# Patient Record
Sex: FEMALE | Race: BLACK OR AFRICAN AMERICAN | Employment: OTHER | URBAN - METROPOLITAN AREA
[De-identification: names, ages, dates, MRNs, and addresses within clinical notes are randomized per-mention and may not be internally consistent; named-entity substitution may affect disease eponyms.]

---

## 2017-01-03 DIAGNOSIS — E87.6 HYPOKALEMIA: ICD-10-CM

## 2017-01-03 RX ORDER — POTASSIUM CHLORIDE 750 MG/1
10 TABLET, FILM COATED, EXTENDED RELEASE ORAL 2 TIMES WEEKLY
Qty: 90 TAB | Refills: 3 | Status: SHIPPED | OUTPATIENT
Start: 2017-01-06 | End: 2018-02-06 | Stop reason: SDUPTHER

## 2017-03-13 ENCOUNTER — OFFICE VISIT (OUTPATIENT)
Dept: FAMILY MEDICINE CLINIC | Age: 72
End: 2017-03-13

## 2017-03-13 VITALS
SYSTOLIC BLOOD PRESSURE: 136 MMHG | WEIGHT: 210.4 LBS | HEART RATE: 58 BPM | HEIGHT: 65 IN | OXYGEN SATURATION: 97 % | TEMPERATURE: 96.6 F | RESPIRATION RATE: 16 BRPM | DIASTOLIC BLOOD PRESSURE: 80 MMHG | BODY MASS INDEX: 35.06 KG/M2

## 2017-03-13 DIAGNOSIS — Z12.11 COLON CANCER SCREENING: ICD-10-CM

## 2017-03-13 DIAGNOSIS — R73.02 IGT (IMPAIRED GLUCOSE TOLERANCE): ICD-10-CM

## 2017-03-13 DIAGNOSIS — E78.5 HYPERLIPIDEMIA, UNSPECIFIED HYPERLIPIDEMIA TYPE: ICD-10-CM

## 2017-03-13 DIAGNOSIS — Z51.81 ENCOUNTER FOR MEDICATION MONITORING: ICD-10-CM

## 2017-03-13 DIAGNOSIS — I10 ESSENTIAL HYPERTENSION: Primary | ICD-10-CM

## 2017-03-13 NOTE — PROGRESS NOTES
HISTORY OF PRESENT ILLNESS  Dimitri Stanley is a 66 y.o. female. HPI   Follow up on chronic medical problems. Cardiovascular Review:  The patient has hypertension and hyperlipidemia. Last lipid profile in 11/16 was WNL. Tolerating  Norvasc very well. No chest pain and no SOB. Diet and Lifestyle: generally follows a low fat low cholesterol diet, generally follows a low sodium diet, exercises regularly  Home BP Monitoring: Ranging 120s/80s. Pertinent ROS: taking medications as instructed, no TIA's, no chest pain on exertion, no dyspnea on exertion, no swelling of ankles. Has had some irregular heart beat over the last several weeks. Comes and goes. Glucose intolerance reveiw:  She has IGT. Last a1c level was 5.9% in 12/16. Diabetic ROS - further diabetic ROS: no polyuria or polydipsia, no chest pain, dyspnea or TIA's, no numbness, tingling or pain in extremities, no unusual visual symptoms. Lab review: orders written for new lab studies as appropriate; see orders. Patient Active Problem List   Diagnosis Code    Hypovitaminosis D E55.9    Hyperlipidemia E78.5    Asymptomatic PVCs/APC's I49.3    IGT (impaired glucose tolerance) R73.02    Essential hypertension I10    Encounter for medication monitoring Z51.81       Current Outpatient Prescriptions   Medication Sig Dispense Refill    potassium chloride SR (KLOR-CON 10) 10 mEq tablet Take 1 Tab by mouth two (2) times a week. 90 Tab 3    triamterene-hydroCHLOROthiazide (DYAZIDE) 37.5-25 mg per capsule Take 1 Cap by mouth daily. 90 Cap 3    amLODIPine (NORVASC) 5 mg tablet TAKE 1 TABLET BY MOUTH DAILY 90 Tab 3    promethazine-dextromethorphan (PROMETHAZINE-DM) 6.25-15 mg/5 mL syrup Take 5 mL by mouth every six (6) hours as needed for Cough. 180 mL 1    Calcium-Cholecalciferol, D3, 600 mg(1,500mg) -400 unit chew Take 1 Tab by mouth daily.  aspirin 81 mg tablet Take 1 Tab by mouth daily.          Allergies   Allergen Reactions    Triamterene Swelling and Other (comments)     Diazide is ok. ...just it's generic.     Prinzide [Lisinopril-Hydrochlorothiazide] Shortness of Breath     fatigue       Past Medical History:   Diagnosis Date    Hypertension        Past Surgical History:   Procedure Laterality Date    HX DILATION AND CURETTAGE      [place] COLONOSCOPY FLX DX W/COLLJ SPEC WHEN PFRMD      \"long time ago\"       Family History   Problem Relation Age of Onset    Anemia Mother     Heart Failure Mother     Heart Failure Father     Dementia Father      alzheimers    No Known Problems Sister     No Known Problems Brother     No Known Problems Brother     No Known Problems Brother     Heart Attack Sister     No Known Problems Brother     No Known Problems Sister        Social History   Substance Use Topics    Smoking status: Never Smoker    Smokeless tobacco: Never Used    Alcohol use 0.0 oz/week     0 Standard drinks or equivalent per week      Comment: rarely        Lab Results  Component Value Date/Time   WBC 6.3 12/07/2016 09:54 AM   Hemoglobin (POC) 15.3 12/26/2014 05:11 PM   HGB 13.1 12/07/2016 09:54 AM   Hematocrit (POC) 45 12/26/2014 05:11 PM   HCT 41.7 12/07/2016 09:54 AM   PLATELET 345 74/58/2393 09:54 AM   MCV 74 12/07/2016 09:54 AM       Lab Results  Component Value Date/Time   Cholesterol, total 202 12/07/2016 09:54 AM   HDL Cholesterol 53 12/07/2016 09:54 AM   LDL, calculated 123 12/07/2016 09:54 AM   Triglyceride 130 12/07/2016 09:54 AM       Lab Results  Component Value Date/Time   TSH 0.876 10/15/2014 09:30 AM   T4, Free 1.14 10/15/2014 09:30 AM      Lab Results   Component Value Date/Time    Sodium 141 12/07/2016 09:54 AM    Potassium 3.5 12/07/2016 09:54 AM    Chloride 93 12/07/2016 09:54 AM    CO2 33 12/07/2016 09:54 AM    Anion gap 4 12/26/2014 05:04 PM    Glucose 97 12/07/2016 09:54 AM    BUN 13 12/07/2016 09:54 AM    Creatinine 0.78 12/07/2016 09:54 AM    BUN/Creatinine ratio 17 12/07/2016 09:54 AM    GFR est AA 88 12/07/2016 09:54 AM    GFR est non-AA 76 12/07/2016 09:54 AM    Calcium 9.7 12/07/2016 09:54 AM    Bilirubin, total 0.2 12/07/2016 09:54 AM    ALT (SGPT) 18 12/07/2016 09:54 AM    AST (SGOT) 18 12/07/2016 09:54 AM    Alk. phosphatase 69 12/07/2016 09:54 AM    Protein, total 7.4 12/07/2016 09:54 AM    Albumin 4.4 12/07/2016 09:54 AM    A-G Ratio 1.5 12/07/2016 09:54 AM      Lab Results   Component Value Date/Time    Hemoglobin A1c 6.2 06/26/2013 09:48 AM    Hemoglobin A1c (POC) 5.9 12/07/2016 09:54 AM         Review of Systems   Constitutional: Negative for malaise/fatigue. HENT: Negative for congestion. Eyes: Negative for blurred vision. Respiratory: Negative for cough and shortness of breath. Cardiovascular: Negative for chest pain, palpitations and leg swelling. Gastrointestinal: Negative for abdominal pain, constipation and heartburn. Genitourinary: Negative for dysuria, frequency and urgency. Musculoskeletal: Negative for back pain and joint pain. Neurological: Negative for dizziness, tingling and headaches. Endo/Heme/Allergies: Negative for environmental allergies. Psychiatric/Behavioral: Negative for depression. The patient does not have insomnia. Physical Exam   Constitutional: She appears well-developed and well-nourished. /80  Pulse (!) 58  Temp 96.6 °F (35.9 °C) (Oral)   Resp 16  Ht 5' 5\" (1.651 m)  Wt 210 lb 6.4 oz (95.4 kg)  LMP 08/15/1994  SpO2 97%  BMI 35.01 kg/m2     HENT:   Right Ear: Tympanic membrane and ear canal normal.   Left Ear: Tympanic membrane and ear canal normal.   Nose: No mucosal edema or rhinorrhea. Mouth/Throat: Oropharynx is clear and moist and mucous membranes are normal.   Neck: Normal range of motion. Neck supple. No thyromegaly present. Cardiovascular: Normal rate and regular rhythm. No murmur heard. Pulmonary/Chest: Effort normal and breath sounds normal.   Abdominal: Soft. Bowel sounds are normal. There is no tenderness. Musculoskeletal: Normal range of motion. She exhibits no edema. Lymphadenopathy:     She has no cervical adenopathy. Skin: Skin is warm and dry. Psychiatric: She has a normal mood and affect. Nursing note and vitals reviewed. ASSESSMENT and Ronny Sebas was seen today for hypertension. Diagnoses and all orders for this visit:    Essential hypertension  Discussed sodium restriction, high k rich diet, maintaining ideal body weight and regular exercise program such as daily walking 30 min perday 4-5 times per week, as physiologic means to achieve blood pressure control.  Medication compliance advised. Hyperlipidemia, unspecified hyperlipidemia type  Continue to monitor. Work on diet and exercise. IGT (impaired glucose tolerance)  Continue to monitor. Work on diet and exercise. Encounter for medication monitoring    Colon cancer screening  Declines colonoscopy   -     OCCULT BLOOD, IMMUNOASSAY (FIT)      Follow-up Disposition:  Return in about 4 months (around 7/13/2017) for physical.  reviewed diet, exercise and weight control  cardiovascular risk and specific lipid/LDL goals reviewed  reviewed medications and side effects in detail    I have discussed diagnosis listed in this note with pt and/or family. I have discussed treatment plans and options and the risk/benefit analysis of those options, including safe use of medications and possible medication side effects. Through the use of shared decision making we have agreed to the above plan. The patient has received an after-visit summary and questions were answered concerning future plans and follow up. Advise pt of any urgent changes then to proceed to the ER.

## 2017-03-13 NOTE — MR AVS SNAPSHOT
Visit Information Date & Time Provider Department Dept. Phone Encounter #  
 3/13/2017  8:45 AM Fransisca Putnam MD [facility] [phone] [id number] Follow-up Instructions Return in about 4 months (around 7/13/2017) for physical.  
  
Upcoming Health Maintenance Date Due  
 MEDICARE YEARLY EXAM 3/8/2017 GLAUCOMA SCREENING Q2Y 5/16/2018 BREAST CANCER SCRN MAMMOGRAM 8/3/2018 DTaP/Tdap/Td series (2 - Td) 8/25/2025 Allergies as of 3/13/2017  Review Complete On: 3/13/2017 By: Fransisca Putnam MD  
  
 Severity Noted Reaction Type Reactions Triamterene Medium 05/17/2011    Swelling, Other (comments) Diazide is ok. ...just it's generic. Prinzide [Lisinopril-hydrochlorothiazide]  09/18/2013    Shortness of Breath  
 fatigue Current Immunizations  Reviewed on 3/13/2017 Name Date Influenza High Dose Vaccine PF 11/25/2015, 10/15/2014 Influenza Vaccine 11/19/2013 Influenza Vaccine (Quad) PF 12/27/2016 Influenza Vaccine Split 12/10/2012, 9/20/2011, 11/5/2010 Pneumococcal Conjugate (PCV-13) 8/25/2015 Pneumococcal Polysaccharide (PPSV-23) 12/10/2012 Tdap 8/25/2015 Reviewed by Fransisca Putnam MD on 3/13/2017 at  9:40 AM  
You Were Diagnosed With   
  
 Codes Comments Essential hypertension    -  Primary ICD-10-CM: I10 
ICD-9-CM: 401.9 Hyperlipidemia, unspecified hyperlipidemia type     ICD-10-CM: E78.5 ICD-9-CM: 272.4 IGT (impaired glucose tolerance)     ICD-10-CM: R73.02 
ICD-9-CM: 790.22 Encounter for medication monitoring     ICD-10-CM: Z51.81 
ICD-9-CM: V58.83 Colon cancer screening     ICD-10-CM: Z12.11 ICD-9-CM: V76.51 Vitals BP Pulse Temp Resp Height(growth percentile) Weight(growth percentile) 136/80 (!) 58 96.6 °F (35.9 °C) (Oral) 16 5' 5\" (1.651 m) 210 lb 6.4 oz (95.4 kg) LMP SpO2 BMI OB Status Smoking Status 08/15/1994 97% 35.01 kg/m2 Postmenopausal Never Smoker Vitals History BMI and BSA Data Body Mass Index Body Surface Area 35.01 kg/m 2 2.09 m 2 Preferred Pharmacy Pharmacy Name Phone [facility] [address] [place] [phone] [facility] [address] [phone] Your Updated Medication List  
  
   
This list is accurate as of: 3/13/17  9:45 AM.  Always use your most recent med list. amLODIPine 5 mg tablet Commonly known as:  Marita Darting TAKE 1 TABLET BY MOUTH DAILY  
  
 aspirin 81 mg tablet Take 1 Tab by mouth daily. Calcium-Cholecalciferol (D3) 600 mg(1,500mg) -400 unit Chew Take 1 Tab by mouth daily. potassium chloride SR 10 mEq tablet Commonly known as:  KLOR-CON 10 Take 1 Tab by mouth two (2) times a week. promethazine-dextromethorphan 6.25-15 mg/5 mL syrup Commonly known as:  PROMETHAZINE-DM Take 5 mL by mouth every six (6) hours as needed for Cough. triamterene-hydroCHLOROthiazide 37.5-25 mg per capsule Commonly known as:  Marcina Majors Take 1 Cap by mouth daily. We Performed the Following OCCULT BLOOD, IMMUNOASSAY (FIT) [id number] CPT(R)] Follow-up Instructions Return in about 4 months (around 7/13/2017) for physical.  
  
  
Introducing [facility]! [facility] introduces [employer] patient portal. Now you can access parts of your medical record, email your doctor's office, and request medication refills online. 1. In your internet browser, go to [email or web address] to Have [email or web address] to Have You 2. Click on the First Time User? Click Here link in the Sign In box. You will see the New Member Sign Up page. 3. Enter your [employer] Access Code exactly as it appears below. You will not need to use this code after youve completed the sign-up process. If you do not sign up before the expiration date, you must request a new code. · [employer] Access Code: [email or web address] Expires: 6/11/2017  9:44 AM 
 
 4. Enter the last four digits of your Social Security Number (xxxx) and Date of Birth (mm/dd/yyyy) as indicated and click Submit. You will be taken to the next sign-up page. 5. Create a [employer] ID. This will be your [employer] login ID and cannot be changed, so think of one that is secure and easy to remember. 6. Create a [employer] password. You can change your password at any time. 7. Enter your Password Reset Question and Answer. This can be used at a later time if you forget your password. 8. Enter your e-mail address. You will receive e-mail notification when new information is available in [address] 9. Click Sign Up. You can now view and download portions of your medical record. 10. Click the Download Summary menu link to download a portable copy of your medical information. If you have questions, please visit the Frequently Asked Questions section of the [employer] website. Remember, [employer] is NOT to be used for urgent needs. For medical emergencies, dial 911. Now available from your iPhone and Android! Please provide this summary of care documentation to your next provider. Your primary care clinician is listed as Elia Boas. If you have any questions after today's visit, please call [phone].

## 2017-03-13 NOTE — PROGRESS NOTES
Chief Complaint   Patient presents with    Hypertension     follow up       CMP 12/7/2016    A1C 12/7/2016    Lipid 12/7/2016    Mammogram 8/3/2016     Pt states she has not had a colonoscopy.

## 2017-08-04 ENCOUNTER — OFFICE VISIT (OUTPATIENT)
Dept: FAMILY MEDICINE CLINIC | Age: 72
End: 2017-08-04

## 2017-08-04 VITALS
BODY MASS INDEX: 35.51 KG/M2 | OXYGEN SATURATION: 97 % | HEART RATE: 75 BPM | HEIGHT: 64 IN | WEIGHT: 208 LBS | SYSTOLIC BLOOD PRESSURE: 109 MMHG | DIASTOLIC BLOOD PRESSURE: 70 MMHG | TEMPERATURE: 98.2 F | RESPIRATION RATE: 16 BRPM

## 2017-08-04 DIAGNOSIS — E78.5 HYPERLIPIDEMIA, UNSPECIFIED HYPERLIPIDEMIA TYPE: ICD-10-CM

## 2017-08-04 DIAGNOSIS — Z00.00 ROUTINE GENERAL MEDICAL EXAMINATION AT A HEALTH CARE FACILITY: Primary | ICD-10-CM

## 2017-08-04 DIAGNOSIS — I10 ESSENTIAL HYPERTENSION: ICD-10-CM

## 2017-08-04 DIAGNOSIS — Z91.09 ENVIRONMENTAL ALLERGIES: ICD-10-CM

## 2017-08-04 DIAGNOSIS — R73.02 IGT (IMPAIRED GLUCOSE TOLERANCE): ICD-10-CM

## 2017-08-04 DIAGNOSIS — Z12.11 COLON CANCER SCREENING: ICD-10-CM

## 2017-08-04 DIAGNOSIS — Z51.81 ENCOUNTER FOR MEDICATION MONITORING: ICD-10-CM

## 2017-08-04 LAB
BILIRUB UR QL STRIP: NEGATIVE
GLUCOSE UR-MCNC: NEGATIVE MG/DL
HBA1C MFR BLD HPLC: 5.8 %
KETONES P FAST UR STRIP-MCNC: NEGATIVE MG/DL
PH UR STRIP: 7 [PH] (ref 4.6–8)
PROT UR QL STRIP: NEGATIVE MG/DL
SP GR UR STRIP: 1.01 (ref 1–1.03)
UA UROBILINOGEN AMB POC: NORMAL (ref 0.2–1)
URINALYSIS CLARITY POC: NORMAL
URINALYSIS COLOR POC: YELLOW
URINE BLOOD POC: NORMAL
URINE LEUKOCYTES POC: NEGATIVE
URINE NITRITES POC: NEGATIVE

## 2017-08-04 RX ORDER — CHOLECALCIFEROL (VITAMIN D3) 125 MCG
2000 CAPSULE ORAL DAILY
COMMUNITY

## 2017-08-04 RX ORDER — FLUTICASONE PROPIONATE 50 MCG
2 SPRAY, SUSPENSION (ML) NASAL AS NEEDED
COMMUNITY
End: 2019-07-03

## 2017-08-04 RX ORDER — LANOLIN ALCOHOL/MO/W.PET/CERES
325 CREAM (GRAM) TOPICAL 2 TIMES WEEKLY
COMMUNITY

## 2017-08-04 NOTE — PROGRESS NOTES
This is a Subsequent Medicare Annual Wellness Visit providing Personalized Prevention Plan Services (PPPS) (Performed 12 months after initial AWV and PPPS )    I have reviewed the patient's medical history in detail and updated the computerized patient record. Cardiovascular Review:  The patient has hypertension and hyperlipidemia. Last lipid profile in 11/16 was WNL. Tolerating  Norvasc very well. No chest pain and no SOB. Diet and Lifestyle: generally follows a low fat low cholesterol diet, generally follows a low sodium diet, exercises regularly  Home BP Monitoring: Ranging 120s/80s. Pertinent ROS: taking medications as instructed, no TIA's, no chest pain on exertion, no dyspnea on exertion, no swelling of ankles. Has had some irregular heart beat over the last several weeks. Comes and goes. Glucose intolerance reveiw:  She has IGT. Last a1c level was 5.9% in 12/16. Diabetic ROS - further diabetic ROS: no polyuria or polydipsia, no chest pain, dyspnea or TIA's, no numbness, tingling or pain in extremities, no unusual visual symptoms. Lab review: orders written for new lab studies as appropriate; see orders.      History     Past Medical History:   Diagnosis Date    Arthritis     osteo - right knee    GERD (gastroesophageal reflux disease)     Goiter     Ill-defined condition     borderline diabetes    Obstructive sleep apnea (adult) (pediatric)     Unspecified sleep apnea     CPAP      Past Surgical History:   Procedure Laterality Date    COLONOSCOPY N/A 1/23/2017    COLONOSCOPY performed by Cristi Willams MD at [address] [facility], [facility]  2004    repeat 10 yrs     HX CATARACT REMOVAL  6/12    BILATERAL    HX GYN      C - section x 1    HX HEENT  2006    thyroid goiter removed    HX ORTHOPAEDIC  11/13/2013    right knee replacement    HX OTHER SURGICAL      no colon polyps per pt    ID COLONOSCOPY FLX DX W/COLLJ SPEC WHEN PFRMD  3/14/2007 dr. Paola Castaneda    DE COLONOSCOPY W/BIOPSY SINGLE/MULTIPLE  1/23/2012    dr Paola Castaneda     Current Outpatient Prescriptions   Medication Sig Dispense Refill    fexofenadine-pseudoephedrine (ALLEGRA-D 12 HOUR)  mg Tb12 Take 1 Tab by mouth every twelve (12) hours as needed.  traMADol (ULTRAM) 50 mg tablet TAKE 1 TABLET BY MOUTH EVERY 6 HOURS AS NEEDED FOR PAIN MAX 4/DAY 30 Tab 1    VAGIFEM 10 mcg tab vaginal tablet Insert 10 mcg into vagina two (2) times a week. 12    aspirin delayed-release 81 mg tablet Take 81 mg by mouth daily.  cholecalciferol, vitamin d3, (VITAMIN D) 1,000 unit tablet Take 2,000 Units by mouth daily.  MULTIVITAMIN PO Take 1 Tab by mouth daily. Allergies   Allergen Reactions    Oxycodone Other (comments)     Hallucinations     Family History   Problem Relation Age of Onset    Hypertension Mother     Asthma Mother     Heart Disease Mother     Hypertension Father     Heart Disease Father     Hypertension Sister     Alcohol abuse Brother     Heart Disease Brother     Hypertension Sister     Hypertension Sister     Hypertension Sister     Diabetes Brother     Hypertension Brother     Cancer Brother 71     stomach    Hypertension Brother     Other Brother      took awhile to wake up after anesthesia/pt states he had surgery on a Mon and then [de-identified] and Wed is when he had trouble waking up   [employer] Son      colon.     No Known Problems Sister     Hypertension Brother     Diabetes Maternal Uncle     Diabetes Maternal Grandmother      Social History   Substance Use Topics    Smoking status: Never Smoker    Smokeless tobacco: Never Used    Alcohol use Yes      Comment: occasional     Patient Active Problem List   Diagnosis Code    GERD (gastroesophageal reflux disease) K21.9    Obstructive sleep apnea (adult) (pediatric) G47.33    Thyroid disease E07.9    Family history of colon cancer Z80.0    OA (osteoarthritis) M19.90    Multinodular goiter E04.2    Osteoarthritis of right knee M17.11    Primary osteoarthritis of both knees M17.0    Prediabetes R73.03    Encounter for medication monitoring Z51.81    IGT (impaired glucose tolerance) R73.02     Lab Results  Component Value Date/Time   WBC 6.3 12/07/2016 09:54 AM   HGB 13.1 12/07/2016 09:54 AM   Hemoglobin (POC) 15.3 12/26/2014 05:11 PM   HCT 41.7 12/07/2016 09:54 AM   Hematocrit (POC) 45 12/26/2014 05:11 PM   PLATELET 042 80/80/7219 09:54 AM   MCV 74 12/07/2016 09:54 AM   Lab Results  Component Value Date/Time   Cholesterol, total 202 12/07/2016 09:54 AM   HDL Cholesterol 53 12/07/2016 09:54 AM   LDL, calculated 123 12/07/2016 09:54 AM   Triglyceride 130 12/07/2016 09:54 AM   Lab Results  Component Value Date/Time   TSH 0.876 10/15/2014 09:30 AM   T4, Free 1.14 10/15/2014 09:30 AM      Lab Results   Component Value Date/Time    Sodium 141 12/07/2016 09:54 AM    Potassium 3.5 12/07/2016 09:54 AM    Chloride 93 12/07/2016 09:54 AM    CO2 33 12/07/2016 09:54 AM    Anion gap 4 12/26/2014 05:04 PM    Glucose 97 12/07/2016 09:54 AM    BUN 13 12/07/2016 09:54 AM    Creatinine 0.78 12/07/2016 09:54 AM    BUN/Creatinine ratio 17 12/07/2016 09:54 AM    GFR est AA 88 12/07/2016 09:54 AM    GFR est non-AA 76 12/07/2016 09:54 AM    Calcium 9.7 12/07/2016 09:54 AM    Bilirubin, total 0.2 12/07/2016 09:54 AM    ALT (SGPT) 18 12/07/2016 09:54 AM    AST (SGOT) 18 12/07/2016 09:54 AM    Alk. phosphatase 69 12/07/2016 09:54 AM    Protein, total 7.4 12/07/2016 09:54 AM    Albumin 4.4 12/07/2016 09:54 AM    A-G Ratio 1.5 12/07/2016 09:54 AM      Lab Results   Component Value Date/Time    Hemoglobin A1c 6.2 06/26/2013 09:48 AM    Hemoglobin A1c (POC) 5.8 08/04/2017 11:44 AM        Depression Risk Factor Screening:     PHQ over the last two weeks 6/20/2017   Little interest or pleasure in doing things Not at all   Feeling down, depressed or hopeless Not at all   Total Score PHQ 2 0     Alcohol Risk Factor Screening:    On any occasion during the past 3 months, have you had more than 3 drinks containing alcohol? No    Do you average more than 7 drinks per week? No    Functional Ability and Level of Safety:     Hearing Loss   none    Activities of Daily Living   Self-care. Requires assistance with: no ADLs    Fall Risk     Fall Risk Assessment, last 12 mths 6/20/2017   Able to walk? Yes   Fall in past 12 months? No     Functional Ability:   Does the patient exhibit a steady gait? yes    How long did it take the patient to get up and walk from a sitting position? seconds    Is the patient self reliant? (ie can do own laundry, meals, household chores)  yes   Does the patient handle his/her own medications? yes   Does the patient handle his/her own money? yes   Is the patients home safe (ie good lighting, handrails on stairs and bath, etc.)? yes   Did you notice or did patient express any hearing difficulties? no   Did you notice or did patient express any vision difficulties?  no   Were distance and reading eye charts used? yes     Advance Care Planning:   Patient was offered the opportunity to discuss advance care planning:  yes    Does patient have an Advance Directive:  no   If no, did you provide information on Caring Connections?   yes          Abuse Screen   Patient is not abused    Review of Systems   Constitutional: negative for fatigue and malaise  Eyes: negative for irritation and redness  Ears, nose, mouth, throat, and face: negative for earaches, nasal congestion and hoarseness  Respiratory: negative for cough, sputum or dyspnea on exertion  Cardiovascular: negative for chest pain, chest pressure/discomfort, dyspnea, palpitations, irregular heart beats, fatigue, lower extremity edema  Gastrointestinal: negative for dysphagia, dyspepsia, reflux symptoms, nausea, vomiting, change in bowel habits, melena, constipation and abdominal pain  Genitourinary:negative for frequency, dysuria, nocturia, urinary incontinence and vaginal discharge  Integument/breast: negative for rash and dryness  Hematologic/lymphatic: negative for easy bruising and lymphadenopathy  Musculoskeletal:negative for myalgias, arthralgias, stiff joints, neck pain, back pain and muscle weakness  Neurological: negative for headaches, dizziness, tremor and weakness  Endocrine: negative for diabetic symptoms including polyuria and polydipsia    She has not had a colonoscopy and declined exam.  Will get FIT. Physical Examination     Evaluation of Cognitive Function:  Mood/affect:  neutral  Appearance: age appropriate  Family member/caregiver input: NA    Visit Vitals    /70 (BP 1 Location: Right arm, BP Patient Position: Sitting)    Pulse 75    Temp 98.2 °F (36.8 °C) (Oral)    Resp 16    Ht 5' 4.25\" (1.632 m)    Wt 208 lb (94.3 kg)    LMP 08/15/1994    SpO2 97%    BMI 35.43 kg/m2     General:  Alert, cooperative, no distress, appears stated age. Head:  Normocephalic, without obvious abnormality, atraumatic. Ears:  Normal TMs and external ear canals both ears. Nose: Nares normal. Septum midline. Mucosa normal. No drainage or sinus tenderness. Throat: Lips, mucosa, and tongue normal. Teeth and gums normal.   Neck: Supple, symmetrical, trachea midline, no adenopathy, thyroid: no enlargement/tenderness/nodules, no carotid bruit and no JVD. Back:   Symmetric, no curvature. ROM normal. No CVA tenderness. Lungs:   Clear to auscultation bilaterally. Chest wall:  No tenderness or deformity. Heart:  Regular rate and rhythm, S1, S2 normal, no murmur, click, rub or gallop. Breast Exam:  No tenderness, masses, or nipple abnormality. Abdomen:   Soft, non-tender. Bowel sounds normal. No masses,  No organomegaly. Rectal:  Normal tone,  no masses or tenderness  Guaiac negative stool. Extremities: Extremities normal, atraumatic, no cyanosis or edema. Pulses: 2+ and symmetric all extremities.    Skin: Skin color, texture, turgor normal. No rashes or lesions. Lymph nodes: Cervical, supraclavicular, and axillary nodes normal.   Neurologic: CNII-XII intact. Normal strength, sensation and reflexes throughout. Patient Care Team:  Amado Orozco MD as PCP - Agueda Nava MD as Physician (Sleep Medicine)  Alecia Fitzpatrick MD as Consulting Provider (Endocrinology)    Advice/Referrals/Counseling   Education and counseling provided:  Are appropriate based on today's review and evaluation  End-of-Life planning (with patient's consent)      Assessment/Plan   Ariel Haynes was seen today for vaginal itching and cholesterol problem. Diagnoses and all orders for this visit:    ASSESSMENT and PLAN  Diagnoses and all orders for this visit:    1. Routine general medical examination at a health care facility  -     AMB POC URINALYSIS DIP STICK AUTO W/O MICRO    2. Colon cancer screening  -     OCCULT BLOOD, IMMUNOASSAY (FIT)    3. Essential hypertension  Stable     4. Hyperlipidemia, unspecified hyperlipidemia type  -     LIPID PANEL    5. IGT (impaired glucose tolerance)  -     AMB POC HEMOGLOBIN A1C    6. Encounter for medication monitoring  -     METABOLIC PANEL, COMPREHENSIVE  -     CBC W/O DIFF    7. Environmental allergies  Stable with flonase      Follow-up Disposition:  Return in about 6 months (around 2/4/2018). reviewed diet, exercise and weight control  cardiovascular risk and specific lipid/LDL goals reviewed  reviewed medications and side effects in detail  specific diabetic recommendations: low cholesterol diet, weight control and daily exercise discussed and glycohemoglobin and other lab monitoring discussed      I have discussed diagnosis listed in this note with pt and/or family. I have discussed treatment plans and options and the risk/benefit analysis of those options, including safe use of medications and possible medication side effects. Through the use of shared decision making we have agreed to the above plan. The patient has received an after-visit summary and questions were answered concerning future plans and follow up. Advise pt of any urgent changes then to proceed to the ER.

## 2017-08-04 NOTE — PROGRESS NOTES
Chief Complaint   Patient presents with    Complete Physical     Medicare Wellness    CMP 12/7/2016    A1C 12/7/2016    Lipid 12/7/2016    Vitamin D 12/7/16    Mammogram 8/3/2016     Pt states she has not had a colonoscopy.

## 2017-08-04 NOTE — MR AVS SNAPSHOT
Visit Information Date & Time Provider Department Dept. Phone Encounter #  
 8/4/2017 10:15 AM Morena Del Toro MD [facility] [phone] [id number] Follow-up Instructions Return in about 6 months (around 2/4/2018). Upcoming Health Maintenance Date Due  
 MEDICARE YEARLY EXAM 3/8/2017 INFLUENZA AGE 9 TO ADULT 8/1/2017 GLAUCOMA SCREENING Q2Y 5/16/2018 BREAST CANCER SCRN MAMMOGRAM 8/3/2018 DTaP/Tdap/Td series (2 - Td) 8/25/2025 Allergies as of 8/4/2017  Review Complete On: 8/4/2017 By: Morena Del Toro MD  
  
 Severity Noted Reaction Type Reactions Triamterene Medium 05/17/2011    Swelling, Other (comments) Diazide is ok. ...just it's generic. Prinzide [Lisinopril-hydrochlorothiazide]  09/18/2013    Shortness of Breath  
 fatigue Current Immunizations  Reviewed on 3/13/2017 Name Date Influenza High Dose Vaccine PF 11/25/2015, 10/15/2014 Influenza Vaccine 11/19/2013 Influenza Vaccine (Quad) PF 12/27/2016 Influenza Vaccine Split 12/10/2012, 9/20/2011, 11/5/2010 Pneumococcal Conjugate (PCV-13) 8/25/2015 Pneumococcal Polysaccharide (PPSV-23) 12/10/2012 Tdap 8/25/2015 Not reviewed this visit You Were Diagnosed With   
  
 Codes Comments Routine general medical examination at a health care facility    -  Primary ICD-10-CM: Z00.00 ICD-9-CM: V70.0 Colon cancer screening     ICD-10-CM: Z12.11 ICD-9-CM: V76.51 Essential hypertension     ICD-10-CM: I10 
ICD-9-CM: 401.9 IGT (impaired glucose tolerance)     ICD-10-CM: R73.02 
ICD-9-CM: 790.22 Hyperlipidemia, unspecified hyperlipidemia type     ICD-10-CM: E78.5 ICD-9-CM: 272.4 Encounter for medication monitoring     ICD-10-CM: Z51.81 
ICD-9-CM: V58.83 Vitals BP Pulse Temp Resp Height(growth percentile) Weight(growth percentile)  109/70 (BP 1 Location: Right arm, BP Patient Position: Sitting) 75 98.2 °F (36.8 °C) (Oral) 16 5' 4.25\" (1.632 m) 208 lb (94.3 kg) LMP SpO2 BMI OB Status Smoking Status 08/15/1994 97% 35.43 kg/m2 Postmenopausal Never Smoker Vitals History BMI and BSA Data Body Mass Index Body Surface Area  
 35.43 kg/m 2 2.07 m 2 Preferred Pharmacy Pharmacy Name Phone Megan Collier [address] [place], [place] [address] [address] [phone] Your Updated Medication List  
  
   
This list is accurate as of: 8/4/17 11:49 AM.  Always use your most recent med list. amLODIPine 5 mg tablet Commonly known as:  Skip Newcomer TAKE 1 TABLET BY MOUTH DAILY  
  
 aspirin 81 mg tablet Take 1 Tab by mouth daily. ferrous sulfate 325 mg (65 mg iron) tablet Take 325 mg by mouth as needed. FLONASE 50 mcg/actuation nasal spray Generic drug:  fluticasone 2 Sprays by Both Nostrils route as needed for Rhinitis. potassium chloride SR 10 mEq tablet Commonly known as:  KLOR-CON 10 Take 1 Tab by mouth two (2) times a week. triamterene-hydroCHLOROthiazide 37.5-25 mg per capsule Commonly known as:  Ulises Speak Take 1 Cap by mouth daily. VITAMIN D3 2,000 unit Tab Generic drug:  cholecalciferol (vitamin D3) Take 2,000 Units by mouth daily. We Performed the Following AMB POC HEMOGLOBIN A1C [96430 CPT(R)] AMB POC URINALYSIS DIP STICK AUTO W/O MICRO [20173 CPT(R)] CBC W/O DIFF [71722 CPT(R)] LIPID PANEL [52316 CPT(R)] METABOLIC PANEL, COMPREHENSIVE [66303 CPT(R)] OCCULT BLOOD, IMMUNOASSAY (FIT) [id number] CPT(R)] Follow-up Instructions Return in about 6 months (around 2/4/2018). Introducing [facility] SERVICES! Vito Encarnacion introduces [employer] patient portal. Now you can access parts of your medical record, email your doctor's office, and request medication refills online. 1. In your internet browser, go to [email or web address] 2. Click on the First Time User? Click Here link in the Sign In box. You will see the New Member Sign Up page. 3. Enter your [email or web address] Access Code exactly as it appears below. You will not need to use this code after youve completed the sign-up process. If you do not sign up before the expiration date, you must request a new code. · [email or web address] Access Code: [email or web address] Expires: 11/2/2017 11:49 AM 
 
4. Enter the last four digits of your Social Security Number (xxxx) and Date of Birth (mm/dd/yyyy) as indicated and click Submit. You will be taken to the next sign-up page. 5. Create a [email or web address] ID. This will be your [email or web address] login ID and cannot be changed, so think of one that is secure and easy to remember. 6. Create a [email or web address] password. You can change your password at any time. 7. Enter your Password Reset Question and Answer. This can be used at a later time if you forget your password. 8. Enter your e-mail address. You will receive e-mail notification when new information is available in [address] 9. Click Sign Up. You can now view and download portions of your medical record. 10. Click the Download Summary menu link to download a portable copy of your medical information. If you have questions, please visit the Frequently Asked Questions section of the [email or web address] website. Remember, [email or web address] is NOT to be used for urgent needs. For medical emergencies, dial 911. Now available from your iPhone and Android! Please provide this summary of care documentation to your next provider. Your primary care clinician is listed as Huel Glance. If you have any questions after today's visit, please call [phone].

## 2017-08-05 LAB
ALBUMIN SERPL-MCNC: 4.6 G/DL (ref 3.5–4.8)
ALBUMIN/GLOB SERPL: 1.5 {RATIO} (ref 1.2–2.2)
ALP SERPL-CCNC: 62 IU/L (ref 39–117)
ALT SERPL-CCNC: 15 IU/L (ref 0–32)
AST SERPL-CCNC: 20 IU/L (ref 0–40)
BILIRUB SERPL-MCNC: 0.3 MG/DL (ref 0–1.2)
BUN SERPL-MCNC: 14 MG/DL (ref 8–27)
BUN/CREAT SERPL: 15 (ref 12–28)
CALCIUM SERPL-MCNC: 9.9 MG/DL (ref 8.7–10.3)
CHLORIDE SERPL-SCNC: 94 MMOL/L (ref 96–106)
CHOLEST SERPL-MCNC: 188 MG/DL (ref 100–199)
CO2 SERPL-SCNC: 29 MMOL/L (ref 18–29)
CREAT SERPL-MCNC: 0.95 MG/DL (ref 0.57–1)
ERYTHROCYTE [DISTWIDTH] IN BLOOD BY AUTOMATED COUNT: 16.6 % (ref 12.3–15.4)
GLOBULIN SER CALC-MCNC: 3 G/DL (ref 1.5–4.5)
GLUCOSE SERPL-MCNC: 91 MG/DL (ref 65–99)
HCT VFR BLD AUTO: 41.2 % (ref 34–46.6)
HDLC SERPL-MCNC: 56 MG/DL
HGB BLD-MCNC: 13.3 G/DL (ref 11.1–15.9)
INTERPRETATION, 910389: NORMAL
LDLC SERPL CALC-MCNC: 112 MG/DL (ref 0–99)
MCH RBC QN AUTO: 24.1 PG (ref 26.6–33)
MCHC RBC AUTO-ENTMCNC: 32.3 G/DL (ref 31.5–35.7)
MCV RBC AUTO: 75 FL (ref 79–97)
PLATELET # BLD AUTO: 309 X10E3/UL (ref 150–379)
POTASSIUM SERPL-SCNC: 3.7 MMOL/L (ref 3.5–5.2)
PROT SERPL-MCNC: 7.6 G/DL (ref 6–8.5)
RBC # BLD AUTO: 5.53 X10E6/UL (ref 3.77–5.28)
SODIUM SERPL-SCNC: 141 MMOL/L (ref 134–144)
TRIGL SERPL-MCNC: 101 MG/DL (ref 0–149)
VLDLC SERPL CALC-MCNC: 20 MG/DL (ref 5–40)
WBC # BLD AUTO: 6.2 X10E3/UL (ref 3.4–10.8)

## 2017-10-18 ENCOUNTER — CLINICAL SUPPORT (OUTPATIENT)
Dept: FAMILY MEDICINE CLINIC | Age: 72
End: 2017-10-18

## 2017-10-18 DIAGNOSIS — Z23 ENCOUNTER FOR IMMUNIZATION: Primary | ICD-10-CM

## 2017-10-18 NOTE — MR AVS SNAPSHOT
Visit Information Date & Time Provider Department Dept. Phone Encounter #  
 10/18/2017  7:30 AM Howard SwansonJony [phone] [id number] Your Appointments 2/5/2018  7:45 AM  
ROUTINE CARE with Howard Swanson MD  
[facility]) Appt Note: f/u  
 [address] [facility] [address] [id number] [phone] [address] Upcoming Health Maintenance Date Due INFLUENZA AGE 9 TO ADULT 8/1/2017 GLAUCOMA SCREENING Q2Y 5/16/2018 BREAST CANCER SCRN MAMMOGRAM 8/3/2018 MEDICARE YEARLY EXAM 8/5/2018 DTaP/Tdap/Td series (2 - Td) 8/25/2025 Allergies as of 10/18/2017  Review Complete On: 10/18/2017 By: Juli Glass LPN Severity Noted Reaction Type Reactions Triamterene Medium 05/17/2011    Swelling, Other (comments) Diazide is ok. ...just it's generic. Prinzide [Lisinopril-hydrochlorothiazide]  09/18/2013    Shortness of Breath  
 fatigue Current Immunizations  Reviewed on 8/4/2017 Name Date Influenza High Dose Vaccine PF 10/18/2017, 11/25/2015, 10/15/2014 Influenza Vaccine 11/19/2013 Influenza Vaccine (Quad) PF 12/27/2016 Influenza Vaccine Split 12/10/2012, 9/20/2011, 11/5/2010 Pneumococcal Conjugate (PCV-13) 8/25/2015 Pneumococcal Polysaccharide (PPSV-23) 12/10/2012 Tdap 8/25/2015 Not reviewed this visit You Were Diagnosed With   
  
 Codes Comments Encounter for immunization    -  Primary ICD-10-CM: Q43 ICD-9-CM: V03.89 Vitals LMP OB Status Smoking Status 08/15/1994 Postmenopausal Never Smoker Preferred Pharmacy Pharmacy Name Phone [address] [phone] Your Updated Medication List  
  
   
This list is accurate as of: 10/18/17  7:38 AM.  Always use your most recent med list.  
  
 amLODIPine 5 mg tablet Commonly known as:  Alpesh Jean TAKE 1 TABLET BY MOUTH DAILY  
  
 aspirin 81 mg tablet Take 1 Tab by mouth daily. ferrous sulfate 325 mg (65 mg iron) tablet Take 325 mg by mouth as needed. FLONASE 50 mcg/actuation nasal spray Generic drug:  fluticasone 2 Sprays by Both Nostrils route as needed for Rhinitis. potassium chloride SR 10 mEq tablet Commonly known as:  KLOR-CON 10 Take 1 Tab by mouth two (2) times a week. triamterene-hydroCHLOROthiazide 37.5-25 mg per capsule Commonly known as:  Blanco [place] Take 1 Cap by mouth daily. VITAMIN D3 2,000 unit Tab Generic drug:  cholecalciferol (vitamin D3) Take 2,000 Units by mouth daily. We Performed the Following INFLUENZA VIRUS VACCINE, HIGH DOSE SEASONAL, PRESERVATIVE FREE [21285 CPT(R)] NV IMMUNIZ ADMIN,1 SINGLE/COMB VAC/TOXOID [id number] CPT(R)] Introducing [facility]! Ernestine Beltran introduces [employer] patient portal. Now you can access parts of your medical record, email your doctor's office, and request medication refills online. 1. In your internet browser, go to [email or web address] 2. Click on the First Time User? Click Here link in the Sign In box. You will see the New Member Sign Up page. 3. Enter your [employer] Access Code exactly as it appears below. You will not need to use this code after youve completed the sign-up process. If you do not sign up before the expiration date, you must request a new code. · [employer] Access Code: [email or web address] Expires: 11/2/2017 11:49 AM 
 
4. Enter the last four digits of your Social Security Number (xxxx) and Date of Birth (mm/dd/yyyy) as indicated and click Submit. You will be taken to the next sign-up page. 5. Create a [employer] ID. This will be your [employer] login ID and cannot be changed, so think of one that is secure and easy to remember. 6. Create a SwapDrive password. You can change your password at any time. 7. Enter your Password Reset Question and Answer. This can be used at a later time if you forget your password. 8. Enter your e-mail address. You will receive e-mail notification when new information is available in [address] 9. Click Sign Up. You can now view and download portions of your medical record. 10. Click the Download Summary menu link to download a portable copy of your medical information. If you have questions, please visit the Frequently Asked Questions section of the [employer] website. Remember, [employer] is NOT to be used for urgent needs. For medical emergencies, dial 911. Now available from your iPhone and Android! Please provide this summary of care documentation to your next provider. Your primary care clinician is listed as Meaghan Caraballo. If you have any questions after today's visit, please call [phone].

## 2017-10-18 NOTE — PROGRESS NOTES
Verbal order received by Dr. Rosas Feeling dose flu vaccine IM. Pt received high dose flu vaccine IM in right deltoid without any difficulty.

## 2017-12-12 DIAGNOSIS — I10 ESSENTIAL HYPERTENSION: ICD-10-CM

## 2017-12-12 RX ORDER — AMLODIPINE BESYLATE 5 MG/1
TABLET ORAL
Qty: 90 TAB | Refills: 3 | Status: SHIPPED | OUTPATIENT
Start: 2017-12-12 | End: 2019-03-01 | Stop reason: SDUPTHER

## 2017-12-12 RX ORDER — TRIAMTERENE AND HYDROCHLOROTHIAZIDE 37.5; 25 MG/1; MG/1
1 CAPSULE ORAL DAILY
Qty: 90 CAP | Refills: 3 | Status: SHIPPED | OUTPATIENT
Start: 2017-12-12 | End: 2018-05-23 | Stop reason: SDUPTHER

## 2018-02-06 ENCOUNTER — OFFICE VISIT (OUTPATIENT)
Dept: FAMILY MEDICINE CLINIC | Age: 73
End: 2018-02-06

## 2018-02-06 VITALS
RESPIRATION RATE: 16 BRPM | DIASTOLIC BLOOD PRESSURE: 73 MMHG | TEMPERATURE: 98.1 F | OXYGEN SATURATION: 98 % | SYSTOLIC BLOOD PRESSURE: 120 MMHG | BODY MASS INDEX: 36.26 KG/M2 | HEART RATE: 75 BPM | HEIGHT: 64 IN | WEIGHT: 212.4 LBS

## 2018-02-06 DIAGNOSIS — Z51.81 ENCOUNTER FOR MEDICATION MONITORING: ICD-10-CM

## 2018-02-06 DIAGNOSIS — E78.5 HYPERLIPIDEMIA, UNSPECIFIED HYPERLIPIDEMIA TYPE: ICD-10-CM

## 2018-02-06 DIAGNOSIS — Z12.11 COLON CANCER SCREENING: ICD-10-CM

## 2018-02-06 DIAGNOSIS — R73.02 IGT (IMPAIRED GLUCOSE TOLERANCE): ICD-10-CM

## 2018-02-06 DIAGNOSIS — I10 ESSENTIAL HYPERTENSION: Primary | ICD-10-CM

## 2018-02-06 DIAGNOSIS — E87.6 HYPOKALEMIA: ICD-10-CM

## 2018-02-06 DIAGNOSIS — Z12.31 ENCOUNTER FOR SCREENING MAMMOGRAM FOR BREAST CANCER: ICD-10-CM

## 2018-02-06 DIAGNOSIS — E66.9 NON MORBID OBESITY: ICD-10-CM

## 2018-02-06 DIAGNOSIS — Z13.820 SCREENING FOR OSTEOPOROSIS: ICD-10-CM

## 2018-02-06 DIAGNOSIS — E55.9 HYPOVITAMINOSIS D: ICD-10-CM

## 2018-02-06 LAB — HBA1C MFR BLD HPLC: 6.1 %

## 2018-02-06 RX ORDER — POTASSIUM CHLORIDE 750 MG/1
10 TABLET, FILM COATED, EXTENDED RELEASE ORAL 2 TIMES WEEKLY
Qty: 45 TAB | Refills: 1 | Status: SHIPPED | OUTPATIENT
Start: 2018-02-09 | End: 2019-10-28 | Stop reason: SDUPTHER

## 2018-02-06 NOTE — PROGRESS NOTES
Chief Complaint   Patient presents with    Hypertension     6m f/u    Cholesterol Problem     6m f/u    Blood sugar problem     6m f/u     1. Have you been to the ER, urgent care clinic since your last visit? Hospitalized since your last visit? No    2. Have you seen or consulted any other health care providers outside of the [address] [place] [address] since your last visit? Include any pap smears or colon screening.  No    Eye exam Dr. Ivan Tapia ([facility]) 9/2017 approx

## 2018-02-06 NOTE — MR AVS SNAPSHOT
[address] [facility] 
 
 
 [address] [facility] [address] [address] 
[phone] Patient: Suzanne Chacon MRN: [id number] :1945 Visit Information Date & Time Provider Department Dept. Phone Encounter #  
 2018 11:00 AM Hugo Delgado MD [facility] [phone] [id number] Follow-up Instructions Return in about 6 months (around 2018) for physical.  
  
Upcoming Health Maintenance Date Due  
 GLAUCOMA SCREENING Q2Y 2018 BREAST CANCER SCRN MAMMOGRAM 8/3/2018 MEDICARE YEARLY EXAM 2018 DTaP/Tdap/Td series (2 - Td) 2025 Allergies as of 2018  Review Complete On: 2018 By: Hugo Delgado MD  
  
 Severity Noted Reaction Type Reactions Triamterene Medium 2011    Swelling, Other (comments) Diazide is ok. ...just it's generic. Prinzide [Lisinopril-hydrochlorothiazide]  2013    Shortness of Breath  
 fatigue Current Immunizations  Reviewed on 2017 Name Date Influenza High Dose Vaccine PF 10/18/2017, 2015, 10/15/2014 Influenza Vaccine 2013 Influenza Vaccine (Quad) PF 2016 Influenza Vaccine Split 12/10/2012, 2011, 2010 Pneumococcal Conjugate (PCV-13) 2015 Pneumococcal Polysaccharide (PPSV-23) 12/10/2012 Tdap 2015 Not reviewed this visit You Were Diagnosed With   
  
 Codes Comments Essential hypertension    -  Primary ICD-10-CM: I10 
ICD-9-CM: 401.9 Hyperlipidemia, unspecified hyperlipidemia type     ICD-10-CM: E78.5 ICD-9-CM: 272.4 IGT (impaired glucose tolerance)     ICD-10-CM: R73.02 
ICD-9-CM: 790.22 Encounter for medication monitoring     ICD-10-CM: Z51.81 
ICD-9-CM: V58.83 Hypokalemia     ICD-10-CM: E87.6 ICD-9-CM: 276.8 Encounter for screening mammogram for breast cancer     ICD-10-CM: Z12.31 
ICD-9-CM: V76.12  Screening for osteoporosis     ICD-10-CM: Z13.820 
 ICD-9-CM: V82.81 Hypovitaminosis D     ICD-10-CM: E55.9 ICD-9-CM: 268.9 Vitals BP Pulse Temp Resp Height(growth percentile) Weight(growth percentile) 120/73 (BP 1 Location: Left arm, BP Patient Position: Sitting) 75 98.1 °F (36.7 °C) (Oral) 16 5' 4.25\" (1.632 m) 212 lb 6.4 oz (96.3 kg) LMP SpO2 BMI OB Status Smoking Status 08/15/1994 98% 36.18 kg/m2 Postmenopausal Never Smoker Vitals History BMI and BSA Data Body Mass Index Body Surface Area  
 36.18 kg/m 2 2.09 m 2 Preferred Pharmacy Pharmacy Name Phone [facility]  [address] [phone] Your Updated Medication List  
  
   
This list is accurate as of: 2/6/18 11:39 AM.  Always use your most recent med list. amLODIPine 5 mg tablet Commonly known as:  Saintclair Rickers TAKE 1 TABLET BY MOUTH DAILY  
  
 aspirin 81 mg tablet Take 1 Tab by mouth daily. CORICIDIN HBP COUGH AND COLD 4-30 mg Tab Generic drug:  chlorpheniramine-dm Take  by mouth. ferrous sulfate 325 mg (65 mg iron) tablet Take 325 mg by mouth as needed. FLONASE 50 mcg/actuation nasal spray Generic drug:  fluticasone 2 Sprays by Both Nostrils route as needed for Rhinitis. potassium chloride SR 10 mEq tablet Commonly known as:  KLOR-CON 10 Take 1 Tab by mouth two (2) times a week. Start taking on:  2/9/2018  
  
 triamterene-hydroCHLOROthiazide 37.5-25 mg per capsule Commonly known as:  Bekatie Fujita Take 1 Cap by mouth daily. VITAMIN D3 2,000 unit Tab Generic drug:  cholecalciferol (vitamin D3) Take 2,000 Units by mouth daily. Prescriptions Sent to Pharmacy Refills  
 potassium chloride SR (KLOR-CON 10) 10 mEq tablet 1 Starting on: 2/9/2018 Sig: Take 1 Tab by mouth two (2) times a week. Class: Normal  
 Pharmacy: [address] [place] [address], [facility]  #: [phone] Route: Oral  
  
We Performed the Following AMB POC HEMOGLOBIN A1C [02222 CPT(R)] LIPID PANEL [91638 CPT(R)] METABOLIC PANEL, BASIC [18276 CPT(R)] VITAMIN D, 25 HYDROXY [id number] CPT(R)] Follow-up Instructions Return in about 6 months (around 8/6/2018) for physical.  
  
To-Do List   
 02/06/2018 Imaging:  DEXA BONE DENSITY STUDY AXIAL   
  
 02/06/2018 Imaging:  TABBY MAMMO BI SCREENING INCL CAD Introducing hospitals & HEALTH SERVICES! Cristian Cosme introduces [employer] patient portal. Now you can access parts of your medical record, email your doctor's office, and request medication refills online. 1. In your internet browser, go to [email or web address] 2. Click on the First Time User? Click Here link in the Sign In box. You will see the New Member Sign Up page. 3. Enter your Brevado Access Code exactly as it appears below. You will not need to use this code after youve completed the sign-up process. If you do not sign up before the expiration date, you must request a new code. · Brevado Access Code: [email or web address] Expires: 5/7/2018 10:51 AM 
 
4. Enter the last four digits of your Social Security Number (xxxx) and Date of Birth (mm/dd/yyyy) as indicated and click Submit. You will be taken to the next sign-up page. 5. Create a Brevado ID. This will be your Brevado login ID and cannot be changed, so think of one that is secure and easy to remember. 6. Create a Brevado password. You can change your password at any time. 7. Enter your Password Reset Question and Answer. This can be used at a later time if you forget your password. 8. Enter your e-mail address. You will receive e-mail notification when new information is available in [address] 9. Click Sign Up. You can now view and download portions of your medical record. 10. Click the Download Summary menu link to download a portable copy of your medical information. If you have questions, please visit the Frequently Asked Questions section of the DataArt[employer] website. Remember, [employer] is NOT to be used for urgent needs. For medical emergencies, dial 911. Now available from your iPhone and Android! Please provide this summary of care documentation to your next provider. Your primary care clinician is listed as Satinder Danielle. If you have any questions after today's visit, please call [phone].

## 2018-02-07 LAB
25(OH)D3+25(OH)D2 SERPL-MCNC: 40.8 NG/ML (ref 30–100)
BUN SERPL-MCNC: 14 MG/DL (ref 8–27)
BUN/CREAT SERPL: 15 (ref 12–28)
CALCIUM SERPL-MCNC: 10 MG/DL (ref 8.7–10.3)
CHLORIDE SERPL-SCNC: 95 MMOL/L (ref 96–106)
CHOLEST SERPL-MCNC: 186 MG/DL (ref 100–199)
CO2 SERPL-SCNC: 31 MMOL/L (ref 18–29)
CREAT SERPL-MCNC: 0.95 MG/DL (ref 0.57–1)
GFR SERPLBLD CREATININE-BSD FMLA CKD-EPI: 60 ML/MIN/1.73
GFR SERPLBLD CREATININE-BSD FMLA CKD-EPI: 69 ML/MIN/1.73
GLUCOSE SERPL-MCNC: 119 MG/DL (ref 65–99)
HDLC SERPL-MCNC: 51 MG/DL
INTERPRETATION, 910389: NORMAL
LDLC SERPL CALC-MCNC: 104 MG/DL (ref 0–99)
POTASSIUM SERPL-SCNC: 3.6 MMOL/L (ref 3.5–5.2)
SODIUM SERPL-SCNC: 144 MMOL/L (ref 134–144)
TRIGL SERPL-MCNC: 154 MG/DL (ref 0–149)
VLDLC SERPL CALC-MCNC: 31 MG/DL (ref 5–40)

## 2018-04-17 ENCOUNTER — HOSPITAL ENCOUNTER (OUTPATIENT)
Dept: MAMMOGRAPHY | Age: 73
Discharge: HOME | End: 2018-04-17
Attending: FAMILY MEDICINE
Payer: MEDICARE

## 2018-04-17 ENCOUNTER — HOSPITAL ENCOUNTER (OUTPATIENT)
Dept: BONE DENSITY | Age: 73
Discharge: HOME | End: 2018-04-17
Attending: FAMILY MEDICINE
Payer: MEDICARE

## 2018-04-17 DIAGNOSIS — Z13.820 SCREENING FOR OSTEOPOROSIS: ICD-10-CM

## 2018-04-17 DIAGNOSIS — Z12.31 ENCOUNTER FOR SCREENING MAMMOGRAM FOR BREAST CANCER: ICD-10-CM

## 2018-04-17 PROCEDURE — 77067 SCR MAMMO BI INCL CAD: CPT

## 2018-04-17 PROCEDURE — 77080 DXA BONE DENSITY AXIAL: CPT

## 2018-05-23 ENCOUNTER — OFFICE VISIT (OUTPATIENT)
Dept: FAMILY MEDICINE CLINIC | Age: 73
End: 2018-05-23

## 2018-05-23 VITALS
TEMPERATURE: 96.9 F | HEART RATE: 67 BPM | OXYGEN SATURATION: 97 % | BODY MASS INDEX: 35.78 KG/M2 | HEIGHT: 64 IN | DIASTOLIC BLOOD PRESSURE: 76 MMHG | RESPIRATION RATE: 16 BRPM | SYSTOLIC BLOOD PRESSURE: 111 MMHG | WEIGHT: 209.6 LBS

## 2018-05-23 DIAGNOSIS — M10.9 ACUTE GOUT OF LEFT ANKLE, UNSPECIFIED CAUSE: ICD-10-CM

## 2018-05-23 DIAGNOSIS — I10 ESSENTIAL HYPERTENSION: Primary | ICD-10-CM

## 2018-05-23 DIAGNOSIS — R73.02 IGT (IMPAIRED GLUCOSE TOLERANCE): ICD-10-CM

## 2018-05-23 DIAGNOSIS — E78.5 HYPERLIPIDEMIA, UNSPECIFIED HYPERLIPIDEMIA TYPE: ICD-10-CM

## 2018-05-23 DIAGNOSIS — Z51.81 ENCOUNTER FOR MEDICATION MONITORING: ICD-10-CM

## 2018-05-23 PROBLEM — E66.01 SEVERE OBESITY (BMI 35.0-39.9) WITH COMORBIDITY (HCC): Status: ACTIVE | Noted: 2018-05-23

## 2018-05-23 LAB
GLUCOSE POC: 142 MG/DL
HBA1C MFR BLD HPLC: 6.2 %

## 2018-05-23 RX ORDER — PREDNISONE 10 MG/1
10 TABLET ORAL 2 TIMES DAILY
Qty: 10 TAB | Refills: 0 | Status: SHIPPED | OUTPATIENT
Start: 2018-05-23 | End: 2018-05-28

## 2018-05-23 RX ORDER — TRIAMTERENE AND HYDROCHLOROTHIAZIDE 37.5; 25 MG/1; MG/1
1 CAPSULE ORAL EVERY OTHER DAY
Qty: 90 CAP | Refills: 3
Start: 2018-05-23 | End: 2019-03-01 | Stop reason: SDUPTHER

## 2018-05-23 RX ORDER — IBUPROFEN 200 MG
200 TABLET ORAL
COMMUNITY
End: 2021-02-16 | Stop reason: ALTCHOICE

## 2018-05-23 NOTE — PROGRESS NOTES
HISTORY OF PRESENT ILLNESS  Олег Singleton is a 67 y.o. female. HPI   Had flare up on gout started in the right great toe on 5/14 and then moved to the right ankle. Now has the pain that is in the left ankle. Hurts to have the covers to touch the area. Has swelling and feels warm to touch. Has not have nay problems with her gout in a long while. Not sure what triggered this last bout. Denies excessive red meat, beer of seafood in the diet. Cardiovascular Review:  The patient has hypertension and hyperlipidemia. Tolerating dyazide and Norvasc very well. No chest pain and no SOB. Diet and Lifestyle: generally follows a low fat low cholesterol diet, generally follows a low sodium diet, exercises regularly  Home BP Monitoring: Ranging 120s/80s. Pertinent ROS: taking medications as instructed, no TIA's, no chest pain on exertion, no dyspnea on exertion, no swelling of ankles. Has had some irregular heart beat over the last several weeks. Comes and goes. Glucose intolerance reveiw:  She has IGT. Diabetic ROS - further diabetic ROS: no polyuria or polydipsia, no chest pain, dyspnea or TIA's, no numbness, tingling or pain in extremities, no unusual visual symptoms. Lab review: orders written for new lab studies as appropriate; see orders. Patient Active Problem List   Diagnosis Code    Hypovitaminosis D E55.9    Hyperlipidemia E78.5    Asymptomatic PVCs/APC's I49.3    IGT (impaired glucose tolerance) R73.02    Essential hypertension I10    Encounter for medication monitoring Z51.81    Severe obesity (BMI 35.0-39. 9) with comorbidity (HCC) E66.01       Current Outpatient Prescriptions   Medication Sig Dispense Refill    ibuprofen (ADVIL) 200 mg tablet Take 200 mg by mouth every six (6) hours as needed for Pain.  triamterene-hydroCHLOROthiazide (DYAZIDE) 37.5-25 mg per capsule Take 1 Cap by mouth every other day.  90 Cap 3    predniSONE (DELTASONE) 10 mg tablet Take 1 Tab by mouth two (2) times a day for 5 days. For gout 10 Tab 0    chlorpheniramine-dm (CORICIDIN HBP COUGH AND COLD) 4-30 mg tab Take 1 Tab by mouth daily as needed.  potassium chloride SR (KLOR-CON 10) 10 mEq tablet Take 1 Tab by mouth two (2) times a week. 45 Tab 1    amLODIPine (NORVASC) 5 mg tablet TAKE 1 TABLET BY MOUTH DAILY 90 Tab 3    cholecalciferol, vitamin D3, (VITAMIN D3) 2,000 unit tab Take 2,000 Units by mouth daily.  ferrous sulfate 325 mg (65 mg iron) tablet Take 325 mg by mouth two (2) times a week.  fluticasone (FLONASE) 50 mcg/actuation nasal spray 2 Sprays by Both Nostrils route as needed for Rhinitis.  aspirin 81 mg tablet Take 1 Tab by mouth daily. Allergies   Allergen Reactions    Triamterene Swelling and Other (comments)     Diazide is ok. ...just it's generic.     Prinzide [Lisinopril-Hydrochlorothiazide] Shortness of Breath     fatigue       Past Medical History:   Diagnosis Date    Gout     Hypertension        Past Surgical History:   Procedure Laterality Date    HX BREAST BIOPSY Right     benign bx age 41's   [de-identified] DILATION AND CURETTAGE      PA COLONOSCOPY FLX DX W/COLLJ SPEC WHEN PFRMD      \"long time ago\"       Family History   Problem Relation Age of Onset    Anemia Mother     Heart Failure Mother     Heart Failure Father     Dementia Father      alzheimers    No Known Problems Sister     No Known Problems Brother     No Known Problems Brother     No Known Problems Brother     Heart Attack Sister     No Known Problems Brother     No Known Problems Sister        Social History   Substance Use Topics    Smoking status: Never Smoker    Smokeless tobacco: Never Used    Alcohol use 0.0 oz/week     0 Standard drinks or equivalent per week      Comment: rarely        Lab Results  Component Value Date/Time   WBC 6.2 08/04/2017 11:44 AM   HGB 13.3 08/04/2017 11:44 AM   Hemoglobin (POC) 15.3 12/26/2014 05:11 PM   HCT 41.2 08/04/2017 11:44 AM   Hematocrit (POC) 45 12/26/2014 05:11 PM   PLATELET 212 41/20/1994 11:44 AM   MCV 75 (L) 08/04/2017 11:44 AM     Lab Results  Component Value Date/Time   Cholesterol, total 186 02/06/2018 11:49 AM   HDL Cholesterol 51 02/06/2018 11:49 AM   LDL, calculated 104 (H) 02/06/2018 11:49 AM   Triglyceride 154 (H) 02/06/2018 11:49 AM     Lab Results  Component Value Date/Time   TSH 0.876 10/15/2014 09:30 AM   T4, Free 1.14 10/15/2014 09:30 AM      Lab Results   Component Value Date/Time    Sodium 144 02/06/2018 11:49 AM    Potassium 3.6 02/06/2018 11:49 AM    Chloride 95 (L) 02/06/2018 11:49 AM    CO2 31 (H) 02/06/2018 11:49 AM    Anion gap 4 (L) 12/26/2014 05:04 PM    Glucose 119 (H) 02/06/2018 11:49 AM    BUN 14 02/06/2018 11:49 AM    Creatinine 0.95 02/06/2018 11:49 AM    BUN/Creatinine ratio 15 02/06/2018 11:49 AM    GFR est AA 69 02/06/2018 11:49 AM    GFR est non-AA 60 02/06/2018 11:49 AM    Calcium 10.0 02/06/2018 11:49 AM    Bilirubin, total 0.3 08/04/2017 11:44 AM    ALT (SGPT) 15 08/04/2017 11:44 AM    AST (SGOT) 20 08/04/2017 11:44 AM    Alk. phosphatase 62 08/04/2017 11:44 AM    Protein, total 7.6 08/04/2017 11:44 AM    Albumin 4.6 08/04/2017 11:44 AM    A-G Ratio 1.5 08/04/2017 11:44 AM      Lab Results   Component Value Date/Time    Hemoglobin A1c 6.2 (H) 06/26/2013 09:48 AM    Hemoglobin A1c (POC) 6.2 05/23/2018 08:24 AM         Review of Systems   Constitutional: Negative for malaise/fatigue. HENT: Negative for congestion. Eyes: Negative for blurred vision. Respiratory: Negative for cough and shortness of breath. Cardiovascular: Negative for chest pain, palpitations and leg swelling. Gastrointestinal: Negative for abdominal pain, constipation and heartburn. Genitourinary: Negative for dysuria, frequency and urgency. Musculoskeletal: Positive for joint pain. Negative for back pain. Neurological: Negative for dizziness, tingling and headaches. Endo/Heme/Allergies: Negative for environmental allergies. Psychiatric/Behavioral: Negative for depression. The patient does not have insomnia. Physical Exam   Constitutional: She appears well-developed and well-nourished. /76 (BP 1 Location: Left arm, BP Patient Position: Sitting)  Pulse 67  Temp 96.9 °F (36.1 °C) (Oral)   Resp 16  Ht 5' 4.25\" (1.632 m)  Wt 209 lb 9.6 oz (95.1 kg)  LMP 08/15/1994  SpO2 97%  BMI 35.7 kg/m2     HENT:   Right Ear: Tympanic membrane and ear canal normal.   Left Ear: Tympanic membrane and ear canal normal.   Nose: No mucosal edema or rhinorrhea. Mouth/Throat: Oropharynx is clear and moist and mucous membranes are normal.   Neck: Normal range of motion. Neck supple. No thyromegaly present. Cardiovascular: Normal rate and regular rhythm. No murmur heard. Pulmonary/Chest: Effort normal and breath sounds normal.   Abdominal: Soft. Bowel sounds are normal. There is no tenderness. Musculoskeletal: Normal range of motion. She exhibits no edema. Lymphadenopathy:     She has no cervical adenopathy. Skin: Skin is warm and dry. Psychiatric: She has a normal mood and affect. Nursing note and vitals reviewed. ASSESSMENT and PLAN  Diagnoses and all orders for this visit:    1. Essential hypertension  At goal.    -    Reduce d/t gout triamterene-hydroCHLOROthiazide (DYAZIDE) 37.5-25 mg per capsule; Take 1 Cap by mouth every other day. Discussed sodium restriction, high k rich diet, maintaining ideal body weight and regular exercise program such as daily walking 30 min perday 4-5 times per week, as physiologic means to achieve blood pressure control.  Medication compliance advised. 2. IGT (impaired glucose tolerance)  a1c level is at 6.2%  -     AMB POC HEMOGLOBIN A1C  -     AMB POC GLUCOSE, QUANTITATIVE, BLOOD    3. Acute gout of left ankle, unspecified cause  -     URIC ACID  -     predniSONE (DELTASONE) 10 mg tablet; Take 1 Tab by mouth two (2) times a day for 5 days.  For gout  Instructions for exercises given and reviewed with pt. Pt also to use heat to the area 3-4 times a day over the next several days until sx are improved. 4. Hyperlipidemia, unspecified hyperlipidemia type  Continue to monitor. Work on diet and exercise. 5. Encounter for medication monitoring  -     METABOLIC PANEL, BASIC      Follow-up Disposition:  Return in about 3 months (around 8/23/2018). reviewed diet, exercise and weight control  cardiovascular risk and specific lipid/LDL goals reviewed  reviewed medications and side effects in detail  specific diabetic recommendations: low cholesterol diet, weight control and daily exercise discussed and glycohemoglobin and other lab monitoring discussed     I have discussed diagnosis listed in this note with pt and/or family. I have discussed treatment plans and options and the risk/benefit analysis of those options, including safe use of medications and possible medication side effects. Through the use of shared decision making we have agreed to the above plan. The patient has received an after-visit summary and questions were answered concerning future plans and follow up. Advise pt of any urgent changes then to proceed to the ER.

## 2018-05-23 NOTE — MR AVS SNAPSHOT
[address] [facility] [place] 
 
 
 [address] [facility] [address] [address] 
[phone] Patient: Clare Mathew MRN: [id number] :1945 Visit Information Date & Time Provider Department Dept. Phone Encounter #  
 2018  7:45 AM Nerissa Nicholson MD [facility] [phone] [id number] Follow-up Instructions Return in about 3 months (around 2018). Your Appointments 2018  9:15 AM  
Medicare Physical with Nerissa Nicholson MD  
[facility]) Appt Note: medicare wellness [address] [facility] [address] [address]  
[phone] [address] [place] [address] Upcoming Health Maintenance Date Due  
 GLAUCOMA SCREENING Q2Y 2018 Influenza Age 5 to Adult 2018 MEDICARE YEARLY EXAM 2018 BREAST CANCER SCRN MAMMOGRAM 2020 DTaP/Tdap/Td series (2 - Td) 2025 Allergies as of 2018  Review Complete On: 2018 By: Nerissa Nicholson MD  
  
 Severity Noted Reaction Type Reactions Triamterene Medium 2011    Swelling, Other (comments) Diazide is ok. ...just it's generic. Prinzide [Lisinopril-hydrochlorothiazide]  2013    Shortness of Breath  
 fatigue Current Immunizations  Reviewed on 2017 Name Date Influenza High Dose Vaccine PF 10/18/2017, 2015, 10/15/2014 Influenza Vaccine 2013 Influenza Vaccine (Quad) PF 2016 Influenza Vaccine Split 12/10/2012, 2011, 2010 Pneumococcal Conjugate (PCV-13) 2015 Pneumococcal Polysaccharide (PPSV-23) 12/10/2012 Tdap 2015 Not reviewed this visit You Were Diagnosed With   
  
 Codes Comments Essential hypertension    -  Primary ICD-10-CM: I10 
ICD-9-CM: 401.9  IGT (impaired glucose tolerance)     ICD-10-CM: R73.02 
ICD-9-CM: 790.22   
 Acute gout of left ankle, unspecified cause     ICD-10-CM: M10.9 ICD-9-CM: 274.01 Encounter for medication monitoring     ICD-10-CM: Z51.81 
ICD-9-CM: V58.83 Vitals BP Pulse Temp Resp Height(growth percentile) Weight(growth percentile) 111/76 (BP 1 Location: Left arm, BP Patient Position: Sitting) 67 96.9 °F (36.1 °C) (Oral) 16 5' 4.25\" (1.632 m) 209 lb 9.6 oz (95.1 kg) LMP SpO2 BMI OB Status Smoking Status 08/15/1994 97% 35.7 kg/m2 Postmenopausal Never Smoker Vitals History BMI and BSA Data Body Mass Index Body Surface Area 35.7 kg/m 2 2.08 m 2 Preferred Pharmacy Pharmacy Name Phone [facility] [address] [place] [address], [facility] [address] [phone] Your Updated Medication List  
  
   
This list is accurate as of 5/23/18  8:32 AM.  Always use your most recent med list. ADVIL 200 mg tablet Generic drug:  ibuprofen Take 200 mg by mouth every six (6) hours as needed for Pain. amLODIPine 5 mg tablet Commonly known as:  Lacy Chimes TAKE 1 TABLET BY MOUTH DAILY  
  
 aspirin 81 mg tablet Take 1 Tab by mouth daily. CORICIDIN HBP COUGH AND COLD 4-30 mg Tab Generic drug:  chlorpheniramine-dm Take 1 Tab by mouth daily as needed. ferrous sulfate 325 mg (65 mg iron) tablet Take 325 mg by mouth two (2) times a week. FLONASE 50 mcg/actuation nasal spray Generic drug:  fluticasone 2 Sprays by Both Nostrils route as needed for Rhinitis. potassium chloride SR 10 mEq tablet Commonly known as:  KLOR-CON 10 Take 1 Tab by mouth two (2) times a week. predniSONE 10 mg tablet Commonly known as:  Dalbert Briones Take 1 Tab by mouth two (2) times a day for 5 days. For gout  
  
 triamterene-hydroCHLOROthiazide 37.5-25 mg per capsule Commonly known as:  Carmela Hikes Take 1 Cap by mouth every other day. VITAMIN D3 2,000 unit Tab Generic drug:  cholecalciferol (vitamin D3) Take 2,000 Units by mouth daily. Prescriptions Sent to Pharmacy Refills  
 predniSONE (DELTASONE) 10 mg tablet 0 Sig: Take 1 Tab by mouth two (2) times a day for 5 days. For gout Class: Normal  
 Pharmacy: [facility] [address] [place] [address] #: [phone] Route: Oral  
  
We Performed the Following AMB POC GLUCOSE, QUANTITATIVE, BLOOD [22810 CPT(R)] AMB POC HEMOGLOBIN A1C [07443 CPT(R)] METABOLIC PANEL, BASIC [65114 CPT(R)] URIC ACID [id number] CPT(R)] Follow-up Instructions Return in about 3 months (around 8/23/2018). Introducing [facility] & HEALTH SERVICES! Masha De León introduces zeeWAVES patient portal. Now you can access parts of your medical record, email your doctor's office, and request medication refills online. 1. In your internet browser, go to [email or web address] 2. Click on the First Time User? Click Here link in the Sign In box. You will see the New Member Sign Up page. 3. Enter your zeeWAVES Access Code exactly as it appears below. You will not need to use this code after youve completed the sign-up process. If you do not sign up before the expiration date, you must request a new code. · zeeWAVES Access Code: [email or web address] Expires: 8/21/2018  8:32 AM 
 
4. Enter the last four digits of your Social Security Number (xxxx) and Date of Birth (mm/dd/yyyy) as indicated and click Submit. You will be taken to the next sign-up page. 5. Create a [email or web address] ID. This will be your zeeWAVES login ID and cannot be changed, so think of one that is secure and easy to remember. 6. Create a zeeWAVES password. You can change your password at any time. 7. Enter your Password Reset Question and Answer. This can be used at a later time if you forget your password. 8. Enter your e-mail address. You will receive e-mail notification when new information is available in [address] 9. Click Sign Up. You can now view and download portions of your medical record. 10. Click the Download Summary menu link to download a portable copy of your medical information. If you have questions, please visit the Frequently Asked Questions section of the [employer] website. Remember, [employer] is NOT to be used for urgent needs. For medical emergencies, dial 911. Now available from your iPhone and Android! Please provide this summary of care documentation to your next provider. Your primary care clinician is listed as Fani Melissa. If you have any questions after today's visit, please call [phone].

## 2018-05-24 LAB
BUN SERPL-MCNC: 14 MG/DL (ref 8–27)
BUN/CREAT SERPL: 11 (ref 12–28)
CALCIUM SERPL-MCNC: 9.9 MG/DL (ref 8.7–10.3)
CHLORIDE SERPL-SCNC: 93 MMOL/L (ref 96–106)
CO2 SERPL-SCNC: 30 MMOL/L (ref 18–29)
CREAT SERPL-MCNC: 1.26 MG/DL (ref 0.57–1)
GFR SERPLBLD CREATININE-BSD FMLA CKD-EPI: 42 ML/MIN/1.73
GFR SERPLBLD CREATININE-BSD FMLA CKD-EPI: 49 ML/MIN/1.73
GLUCOSE SERPL-MCNC: 139 MG/DL (ref 65–99)
INTERPRETATION: NORMAL
POTASSIUM SERPL-SCNC: 3.3 MMOL/L (ref 3.5–5.2)
SODIUM SERPL-SCNC: 141 MMOL/L (ref 134–144)
URATE SERPL-MCNC: 11.6 MG/DL (ref 2.5–7.1)

## 2018-05-31 RX ORDER — ALLOPURINOL 100 MG/1
100 TABLET ORAL DAILY
Qty: 30 TAB | Refills: 3 | Status: SHIPPED | OUTPATIENT
Start: 2018-05-31 | End: 2019-03-05 | Stop reason: SDUPTHER

## 2018-11-02 ENCOUNTER — CLINICAL SUPPORT (OUTPATIENT)
Dept: FAMILY MEDICINE CLINIC | Age: 73
End: 2018-11-02

## 2018-11-02 DIAGNOSIS — Z23 ENCOUNTER FOR IMMUNIZATION: Primary | ICD-10-CM

## 2018-11-02 NOTE — PROGRESS NOTES
Order placed for Flu Vaccine, per Verbal Order from Dr. Vishal Julian on 11/2/2018 due to HM. Patient given flu vaccine in right deltoid and encouraged to wait 15 minutes for adverse reactions she acknowledged understanding.

## 2019-03-01 DIAGNOSIS — I10 ESSENTIAL HYPERTENSION: ICD-10-CM

## 2019-03-01 RX ORDER — TRIAMTERENE AND HYDROCHLOROTHIAZIDE 37.5; 25 MG/1; MG/1
1 CAPSULE ORAL EVERY OTHER DAY
Qty: 90 CAP | Refills: 3 | Status: SHIPPED | OUTPATIENT
Start: 2019-03-01 | End: 2020-05-07

## 2019-03-01 RX ORDER — AMLODIPINE BESYLATE 5 MG/1
TABLET ORAL
Qty: 90 TAB | Refills: 3 | Status: SHIPPED | OUTPATIENT
Start: 2019-03-01 | End: 2019-11-26 | Stop reason: SDUPTHER

## 2019-03-05 RX ORDER — ALLOPURINOL 100 MG/1
TABLET ORAL
Qty: 90 TAB | Refills: 3 | Status: SHIPPED | OUTPATIENT
Start: 2019-03-05 | End: 2019-11-26

## 2019-06-06 ENCOUNTER — OFFICE VISIT (OUTPATIENT)
Dept: FAMILY MEDICINE CLINIC | Age: 74
End: 2019-06-06

## 2019-06-06 ENCOUNTER — TELEPHONE (OUTPATIENT)
Dept: FAMILY MEDICINE CLINIC | Age: 74
End: 2019-06-06

## 2019-06-06 VITALS
WEIGHT: 217.6 LBS | DIASTOLIC BLOOD PRESSURE: 75 MMHG | RESPIRATION RATE: 28 BRPM | OXYGEN SATURATION: 96 % | TEMPERATURE: 99.6 F | HEART RATE: 90 BPM | HEIGHT: 64 IN | SYSTOLIC BLOOD PRESSURE: 125 MMHG | BODY MASS INDEX: 37.15 KG/M2

## 2019-06-06 DIAGNOSIS — R05.9 COUGH IN ADULT: Primary | ICD-10-CM

## 2019-06-06 RX ORDER — PROMETHAZINE HYDROCHLORIDE AND DEXTROMETHORPHAN HYDROBROMIDE 6.25; 15 MG/5ML; MG/5ML
5 SYRUP ORAL
Qty: 240 ML | Refills: 0 | Status: SHIPPED | OUTPATIENT
Start: 2019-06-06 | End: 2019-06-13

## 2019-06-06 RX ORDER — PREDNISONE 20 MG/1
20 TABLET ORAL
Qty: 5 TAB | Refills: 0 | Status: SHIPPED | OUTPATIENT
Start: 2019-06-06 | End: 2019-06-11

## 2019-06-06 NOTE — PROGRESS NOTES
HISTORY OF PRESENT ILLNESS  Morris Hinds is a 75 y.o. female. HPI  Patient of Dr. Agueda Hidalgo with PMHx significant for hypertension, hyperlipidemia, IGT here today for an acute visit with chief complaint of cough. Symptoms started about 2 days ago with sore throat, which has improved. Then developed a non-productive cough. Having some chest pain when she coughs but not otherwise. No fevers. Non-smoker. Had an old Rx for promethazine-DM, which she took until it was gone; this was helping her symptoms. No history of underlying lung disease, including ashtma, COPD, sarcoidosis. No other symptoms, including otalgia, rhinorrhea, sinus congestion. Sore throat is improving. Has not tried anything else for her symptoms. Visit Vitals  /75 (BP 1 Location: Left arm, BP Patient Position: Sitting)   Pulse 90   Temp 99.6 °F (37.6 °C) (Oral)   Resp 28   Ht 5' 4.25\" (1.632 m)   Wt 217 lb 9.6 oz (98.7 kg)   LMP 08/15/1994   SpO2 96%   BMI 37.06 kg/m²     Current Outpatient Medications on File Prior to Visit   Medication Sig Dispense Refill    allopurinol (ZYLOPRIM) 100 mg tablet TAKE 1 TABLET BY MOUTH DAILY 90 Tab 3    amLODIPine (NORVASC) 5 mg tablet TAKE 1 TABLET BY MOUTH DAILY 90 Tab 3    triamterene-hydroCHLOROthiazide (DYAZIDE) 37.5-25 mg per capsule Take 1 Cap by mouth every other day. 90 Cap 3    ibuprofen (ADVIL) 200 mg tablet Take 200 mg by mouth every six (6) hours as needed for Pain.  chlorpheniramine-dm (CORICIDIN HBP COUGH AND COLD) 4-30 mg tab Take 1 Tab by mouth daily as needed.  potassium chloride SR (KLOR-CON 10) 10 mEq tablet Take 1 Tab by mouth two (2) times a week. 45 Tab 1    cholecalciferol, vitamin D3, (VITAMIN D3) 2,000 unit tab Take 2,000 Units by mouth daily.  ferrous sulfate 325 mg (65 mg iron) tablet Take 325 mg by mouth two (2) times a week.  aspirin 81 mg tablet Take 1 Tab by mouth daily.       fluticasone (FLONASE) 50 mcg/actuation nasal spray 2 Sprays by Both Nostrils route as needed for Rhinitis. No current facility-administered medications on file prior to visit. Review of Systems   Constitutional: Negative for chills, fever and malaise/fatigue. HENT: Positive for sore throat. Negative for congestion, ear discharge, ear pain and sinus pain. Eyes: Negative for blurred vision, double vision, pain and discharge. Respiratory: Positive for cough. Negative for sputum production, shortness of breath and wheezing. Cardiovascular: Negative for chest pain and palpitations. Neurological: Negative for weakness and headaches. Physical Exam   Constitutional: She is oriented to person, place, and time. She appears well-developed and well-nourished. No distress. HENT:   Head: Normocephalic and atraumatic. Right Ear: External ear normal.   Left Ear: External ear normal.   Mouth/Throat: Uvula is midline and oropharynx is clear and moist. No oropharyngeal exudate. Mildly erythematous posterior oropharynx without exudate or swelling   Eyes: Pupils are equal, round, and reactive to light. Conjunctivae and EOM are normal. Right eye exhibits no discharge. Left eye exhibits no discharge. Neck: Neck supple. Cardiovascular: Normal rate, regular rhythm and normal heart sounds. Pulmonary/Chest: Effort normal and breath sounds normal. No respiratory distress. She has no wheezes. She has no rales. Lungs clear bilaterally with good air movement throughout   Lymphadenopathy:     She has no cervical adenopathy. Neurological: She is alert and oriented to person, place, and time. Skin: Skin is warm and dry. She is not diaphoretic. Psychiatric: She has a normal mood and affect. Nursing note and vitals reviewed. ASSESSMENT and PLAN    ICD-10-CM ICD-9-CM    1. Cough in adult R05 786.2 promethazine-dextromethorphan (PROMETHAZINE-DM) 6.25-15 mg/5 mL syrup      predniSONE (DELTASONE) 20 mg tablet     1.  Cough - Lungs clear, vitals stable, no signs of distress. I will refill her promethazine-DM and encouraged continued supportive care. I have given her an Rx for prednisone 20mg x 5 days to be started 6/8/19 if no improvement in symptoms. Follow up if new symptoms develop or if symptoms worsen or fail to improve. Overdue for follow up with PCP; advised her to schedule this as soon as possible. Follow-up and Dispositions    · Return if symptoms worsen or fail to improve.

## 2019-06-06 NOTE — PROGRESS NOTES
Chief Complaint   Patient presents with    Sore Throat    Cough     Sore throat started on Tuesday then progressed into cough, chest hurts some. no fever. Hoarse.

## 2019-06-06 NOTE — PATIENT INSTRUCTIONS
Cough: Care Instructions  Your Care Instructions    A cough is your body's response to something that bothers your throat or airways. Many things can cause a cough. You might cough because of a cold or the flu, bronchitis, or asthma. Smoking, postnasal drip, allergies, and stomach acid that backs up into your throat also can cause coughs. A cough is a symptom, not a disease. Most coughs stop when the cause, such as a cold, goes away. You can take a few steps at home to cough less and feel better. Follow-up care is a key part of your treatment and safety. Be sure to make and go to all appointments, and call your doctor if you are having problems. It's also a good idea to know your test results and keep a list of the medicines you take. How can you care for yourself at home? · Drink lots of water and other fluids. This helps thin the mucus and soothes a dry or sore throat. Honey or lemon juice in hot water or tea may ease a dry cough. · Take cough medicine as directed by your doctor. · Prop up your head on pillows to help you breathe and ease a dry cough. · Try cough drops to soothe a dry or sore throat. Cough drops don't stop a cough. Medicine-flavored cough drops are no better than candy-flavored drops or hard candy. · Do not smoke. Avoid secondhand smoke. If you need help quitting, talk to your doctor about stop-smoking programs and medicines. These can increase your chances of quitting for good. When should you call for help? Call 911 anytime you think you may need emergency care.  For example, call if:    · You have severe trouble breathing.    Call your doctor now or seek immediate medical care if:    · You cough up blood.     · You have new or worse trouble breathing.     · You have a new or higher fever.     · You have a new rash.    Watch closely for changes in your health, and be sure to contact your doctor if:    · You cough more deeply or more often, especially if you notice more mucus or a change in the color of your mucus.     · You have new symptoms, such as a sore throat, an earache, or sinus pain.     · You do not get better as expected. Where can you learn more? Go to http://alexandro-josé antonio.info/. Enter D279 in the search box to learn more about \"Cough: Care Instructions. \"  Current as of: September 5, 2018  Content Version: 11.9  © 1108-1325 [employer]. Care instructions adapted under license by [employer] (which disclaims liability or warranty for this information). If you have questions about a medical condition or this instruction, always ask your healthcare professional. [address] any warranty or liability for your use of this information.

## 2019-06-06 NOTE — TELEPHONE ENCOUNTER
----- Message from Sherren Peal sent at 6/6/2019  9:55 AM EDT -----  Regarding: Dr. Michela Rose  Patient called to schedule a same day appointment and there are no appointment available within the patient's timeframe. Patient is experiencing sore throat and persistent cough. Advised pain in chest whenever she coughs. Wants to make sure she does not have pneumonia. Best contact number is [phone].

## 2019-06-17 ENCOUNTER — OFFICE VISIT (OUTPATIENT)
Dept: FAMILY MEDICINE CLINIC | Age: 74
End: 2019-06-17

## 2019-06-17 VITALS
WEIGHT: 218.2 LBS | TEMPERATURE: 98.1 F | DIASTOLIC BLOOD PRESSURE: 66 MMHG | OXYGEN SATURATION: 98 % | BODY MASS INDEX: 37.25 KG/M2 | RESPIRATION RATE: 16 BRPM | SYSTOLIC BLOOD PRESSURE: 129 MMHG | HEIGHT: 64 IN | HEART RATE: 81 BPM

## 2019-06-17 DIAGNOSIS — R05.9 COUGH: ICD-10-CM

## 2019-06-17 DIAGNOSIS — J20.9 ACUTE BRONCHITIS, UNSPECIFIED ORGANISM: Primary | ICD-10-CM

## 2019-06-17 RX ORDER — AZITHROMYCIN 250 MG/1
TABLET, FILM COATED ORAL
Qty: 6 TAB | Refills: 0 | Status: SHIPPED | OUTPATIENT
Start: 2019-06-17 | End: 2019-06-22

## 2019-06-17 RX ORDER — ALBUTEROL SULFATE 90 UG/1
2 AEROSOL, METERED RESPIRATORY (INHALATION)
Qty: 1 INHALER | Refills: 0 | Status: SHIPPED | OUTPATIENT
Start: 2019-06-17 | End: 2019-07-03

## 2019-06-17 NOTE — PATIENT INSTRUCTIONS
Bronchitis: Care Instructions  Your Care Instructions    Bronchitis is inflammation of the bronchial tubes, which carry air to the lungs. The tubes swell and produce mucus, or phlegm. The mucus and inflamed bronchial tubes make you cough. You may have trouble breathing. Most cases of bronchitis are caused by viruses like those that cause colds. Antibiotics usually do not help and they may be harmful. Bronchitis usually develops rapidly and lasts about 2 to 3 weeks in otherwise healthy people. Follow-up care is a key part of your treatment and safety. Be sure to make and go to all appointments, and call your doctor if you are having problems. It's also a good idea to know your test results and keep a list of the medicines you take. How can you care for yourself at home? · Take all medicines exactly as prescribed. Call your doctor if you think you are having a problem with your medicine. · Get some extra rest.  · Take an over-the-counter pain medicine, such as acetaminophen (Tylenol), ibuprofen (Advil, Motrin), or naproxen (Aleve) to reduce fever and relieve body aches. Read and follow all instructions on the label. · Do not take two or more pain medicines at the same time unless the doctor told you to. Many pain medicines have acetaminophen, which is Tylenol. Too much acetaminophen (Tylenol) can be harmful. · Take an over-the-counter cough medicine that contains dextromethorphan to help quiet a dry, hacking cough so that you can sleep. Avoid cough medicines that have more than one active ingredient. Read and follow all instructions on the label. · Breathe moist air from a humidifier, hot shower, or sink filled with hot water. The heat and moisture will thin mucus so you can cough it out. · Do not smoke. Smoking can make bronchitis worse. If you need help quitting, talk to your doctor about stop-smoking programs and medicines. These can increase your chances of quitting for good.   When should you call for help? Call 911 anytime you think you may need emergency care. For example, call if:    · You have severe trouble breathing.    Call your doctor now or seek immediate medical care if:    · You have new or worse trouble breathing.     · You cough up dark brown or bloody mucus (sputum).     · You have a new or higher fever.     · You have a new rash.    Watch closely for changes in your health, and be sure to contact your doctor if:    · You cough more deeply or more often, especially if you notice more mucus or a change in the color of your mucus.     · You are not getting better as expected. Where can you learn more? Go to http://alexandro-josé antonio.info/. Enter H333 in the search box to learn more about \"Bronchitis: Care Instructions. \"  Current as of: September 5, 2018  Content Version: 11.9  © 6290-4182 [employer]. Care instructions adapted under license by [employer] (which disclaims liability or warranty for this information). If you have questions about a medical condition or this instruction, always ask your healthcare professional. [address] any warranty or liability for your use of this information.

## 2019-06-17 NOTE — PROGRESS NOTES
HISTORY OF PRESENT ILLNESS  Cheryl Negro is a 75 y.o. female. HPI  Patient of Dr. Sam Sandy with PMHx significant for hypertension, hyperlipidemia, IGT here today for an acute visit with chief complaint of cough. I saw her for the same complaint on June 6, 2019. Excerpt from that note:  Symptoms started about 2 days ago with sore throat, which has improved. Then developed a non-productive cough. Having some chest pain when she coughs but not otherwise. No fevers. Non-smoker. Had an old Rx for promethazine-DM, which she took until it was gone; this was helping her symptoms. No history of underlying lung disease, including ashtma, COPD, sarcoidosis. No other symptoms, including otalgia, rhinorrhea, sinus congestion. Sore throat is improving. Has not tried anything else for her symptoms. Her lungs at the time were clear, and I refilled her promethazine-DM and asked her to start a prednisone burst 2 days later if symptoms had not improved. She ended up taking about half of the prescribed cough medication and did fill the prednisone prescription, which she did take. She felt a bit better, but has had ongoing productive cough. Reports subjective fever at home last week. No shortness of breath, occasional wheezing. Now having some upper back pain with cough. Took medication as prescribed but no OTCs. Visit Vitals  /66 (BP 1 Location: Right arm, BP Patient Position: Sitting)   Pulse 81   Temp 98.1 °F (36.7 °C) (Oral)   Resp 16   Ht 5' 4.25\" (1.632 m)   Wt 218 lb 3.2 oz (99 kg)   LMP 08/15/1994   SpO2 98%    L/min   BMI 37.16 kg/m²     Current Outpatient Medications on File Prior to Visit   Medication Sig Dispense Refill    LUBRICANT REDNESS RELIEVER 0.03-0.5 % drop daily as needed.       allopurinol (ZYLOPRIM) 100 mg tablet TAKE 1 TABLET BY MOUTH DAILY 90 Tab 3    amLODIPine (NORVASC) 5 mg tablet TAKE 1 TABLET BY MOUTH DAILY 90 Tab 3    triamterene-hydroCHLOROthiazide (DYAZIDE) 37.5-25 mg per capsule Take 1 Cap by mouth every other day. 90 Cap 3    chlorpheniramine-dm (CORICIDIN HBP COUGH AND COLD) 4-30 mg tab Take 1 Tab by mouth daily as needed.  potassium chloride SR (KLOR-CON 10) 10 mEq tablet Take 1 Tab by mouth two (2) times a week. 45 Tab 1    cholecalciferol, vitamin D3, (VITAMIN D3) 2,000 unit tab Take 2,000 Units by mouth daily.  ferrous sulfate 325 mg (65 mg iron) tablet Take 325 mg by mouth two (2) times a week.  aspirin 81 mg tablet Take 1 Tab by mouth daily.  ibuprofen (ADVIL) 200 mg tablet Take 200 mg by mouth every six (6) hours as needed for Pain.  fluticasone (FLONASE) 50 mcg/actuation nasal spray 2 Sprays by Both Nostrils route as needed for Rhinitis. No current facility-administered medications on file prior to visit. Review of Systems   Constitutional: Negative for chills, fever and malaise/fatigue. HENT: Negative for congestion, ear discharge, ear pain, sinus pain and sore throat. Eyes: Negative for blurred vision, double vision, pain and discharge. Respiratory: Positive for cough, sputum production and wheezing. Negative for shortness of breath. Cardiovascular: Negative for chest pain and palpitations. Neurological: Negative for weakness and headaches. Physical Exam   Constitutional: She is oriented to person, place, and time. She appears well-developed and well-nourished. No distress. HENT:   Head: Normocephalic and atraumatic. Nose: Nose normal. Right sinus exhibits no maxillary sinus tenderness and no frontal sinus tenderness. Left sinus exhibits no maxillary sinus tenderness and no frontal sinus tenderness. Mouth/Throat: Uvula is midline. Posterior oropharyngeal erythema present. No posterior oropharyngeal edema.   + PND   Cardiovascular: Normal rate, regular rhythm and normal heart sounds. Pulmonary/Chest: Effort normal and breath sounds normal. No respiratory distress. She has no wheezes.    Occasional non-productive cough   Neurological: She is alert and oriented to person, place, and time. Skin: Skin is warm and dry. She is not diaphoretic. Psychiatric: She has a normal mood and affect. Her behavior is normal. Judgment normal.   Nursing note and vitals reviewed. ASSESSMENT and PLAN    ICD-10-CM ICD-9-CM    1. Acute bronchitis, unspecified organism J20.9 466.0 azithromycin (ZITHROMAX) 250 mg tablet      albuterol (PROVENTIL HFA, VENTOLIN HFA, PROAIR HFA) 90 mcg/actuation inhaler   2. Cough R05 786.2 XR CHEST PA LAT     CXR clear today. Treat with azithromycin along with albuterol inhaler PRN. Continue cough medication as previously prescribed; call if refill is needed. Discussed that cough may linger for several weeks; encouraged rest and continued supportive care. Follow up if symptoms worsen or fail to improve, and with PCP as scheduled next month for routine care. Follow-up and Dispositions    · Return if symptoms worsen or fail to improve.

## 2019-06-17 NOTE — PROGRESS NOTES
Chief Complaint   Patient presents with    Cough     semiproductive cough     Patient here for semiproductive cough since last visit of 6/6/19. Patient has back pain with cough.

## 2019-07-03 ENCOUNTER — OFFICE VISIT (OUTPATIENT)
Dept: FAMILY MEDICINE CLINIC | Age: 74
End: 2019-07-03

## 2019-07-03 VITALS
TEMPERATURE: 97.9 F | OXYGEN SATURATION: 100 % | DIASTOLIC BLOOD PRESSURE: 82 MMHG | HEIGHT: 64 IN | HEART RATE: 60 BPM | WEIGHT: 219.6 LBS | BODY MASS INDEX: 37.49 KG/M2 | RESPIRATION RATE: 18 BRPM | SYSTOLIC BLOOD PRESSURE: 137 MMHG

## 2019-07-03 DIAGNOSIS — Z51.81 ENCOUNTER FOR MEDICATION MONITORING: ICD-10-CM

## 2019-07-03 DIAGNOSIS — Z12.11 COLON CANCER SCREENING: ICD-10-CM

## 2019-07-03 DIAGNOSIS — I10 ESSENTIAL HYPERTENSION: Primary | ICD-10-CM

## 2019-07-03 DIAGNOSIS — E78.5 HYPERLIPIDEMIA, UNSPECIFIED HYPERLIPIDEMIA TYPE: ICD-10-CM

## 2019-07-03 DIAGNOSIS — R73.02 IGT (IMPAIRED GLUCOSE TOLERANCE): ICD-10-CM

## 2019-07-03 DIAGNOSIS — E55.9 HYPOVITAMINOSIS D: ICD-10-CM

## 2019-07-03 LAB
GLUCOSE POC: 112 MG/DL
HBA1C MFR BLD HPLC: 5.9 %

## 2019-07-03 NOTE — PROGRESS NOTES
HISTORY OF PRESENT ILLNESS  William Woodson is a 75 y.o. female. HPI   Follow up on chronic medical problems. Overall feeling well. Cardiovascular Review:  The patient has hypertension and hyperlipidemia. Tolerating Norvasc very well. No chest pain and no SOB. Diet and Lifestyle: generally follows a low fat low cholesterol diet, generally follows a low sodium diet, exercises regularly  Home BP Monitoring: Ranging 120s/80s. Pertinent ROS: taking medications as instructed, no TIA's, no chest pain on exertion, no dyspnea on exertion, noting some slight swelling of ankles that comes and goes. Glucose intolerance reveiw:  She has IGT. Diabetic ROS - further diabetic ROS: no polyuria or polydipsia, no chest pain, dyspnea or TIA's, no numbness, tingling or pain in extremities, no unusual visual symptoms. Lab review: orders written for new lab studies as appropriate; see orders. Patient Active Problem List   Diagnosis Code    Hypovitaminosis D E55.9    Hyperlipidemia E78.5    Asymptomatic PVCs/APC's I49.3    IGT (impaired glucose tolerance) R73.02    Essential hypertension I10    Encounter for medication monitoring Z51.81    Severe obesity (BMI 35.0-39. 9) with comorbidity (HCC) E66.01       Current Outpatient Medications   Medication Sig Dispense Refill    allopurinol (ZYLOPRIM) 100 mg tablet TAKE 1 TABLET BY MOUTH DAILY 90 Tab 3    amLODIPine (NORVASC) 5 mg tablet TAKE 1 TABLET BY MOUTH DAILY 90 Tab 3    triamterene-hydroCHLOROthiazide (DYAZIDE) 37.5-25 mg per capsule Take 1 Cap by mouth every other day. 90 Cap 3    ibuprofen (ADVIL) 200 mg tablet Take 200 mg by mouth every six (6) hours as needed for Pain.  chlorpheniramine-dm (CORICIDIN HBP COUGH AND COLD) 4-30 mg tab Take 1 Tab by mouth daily as needed.  potassium chloride SR (KLOR-CON 10) 10 mEq tablet Take 1 Tab by mouth two (2) times a week.  45 Tab 1    cholecalciferol, vitamin D3, (VITAMIN D3) 2,000 unit tab Take 2,000 Units by mouth daily.  ferrous sulfate 325 mg (65 mg iron) tablet Take 325 mg by mouth two (2) times a week.  aspirin 81 mg tablet Take 1 Tab by mouth daily. Allergies   Allergen Reactions    Triamterene Swelling and Other (comments)     Diazide is ok. ...just it's generic.  Prinzide [Lisinopril-Hydrochlorothiazide] Shortness of Breath     fatigue       Past Medical History:   Diagnosis Date    Gout     Hypertension        Past Surgical History:   Procedure Laterality Date    HX BREAST BIOPSY Right     benign bx age 41's   [de-identified] DILATION AND CURETTAGE      HI COLONOSCOPY FLX DX W/COLLJ SPEC WHEN PFRMD      \"long time ago\"       Family History   Problem Relation Age of Onset    Anemia Mother     Heart Failure Mother     Heart Failure Father     Dementia Father         alzheimers    No Known Problems Sister     No Known Problems Brother     No Known Problems Brother     No Known Problems Brother     Heart Attack Sister     No Known Problems Brother     No Known Problems Sister        Social History     Tobacco Use    Smoking status: Never Smoker    Smokeless tobacco: Never Used   Substance Use Topics    Alcohol use:  Yes     Alcohol/week: 0.0 oz     Comment: rarely        Lab Results   Component Value Date/Time    WBC 6.2 08/04/2017 11:44 AM    HGB 13.3 08/04/2017 11:44 AM    Hemoglobin (POC) 15.3 12/26/2014 05:11 PM    HCT 41.2 08/04/2017 11:44 AM    Hematocrit (POC) 45 12/26/2014 05:11 PM    PLATELET 546 60/93/1016 11:44 AM    MCV 75 (L) 08/04/2017 11:44 AM     Lab Results   Component Value Date/Time    Cholesterol, total 186 02/06/2018 11:49 AM    HDL Cholesterol 51 02/06/2018 11:49 AM    LDL, calculated 104 (H) 02/06/2018 11:49 AM    Triglyceride 154 (H) 02/06/2018 11:49 AM     Lab Results   Component Value Date/Time    TSH 0.876 10/15/2014 09:30 AM    T4, Free 1.14 10/15/2014 09:30 AM      Lab Results   Component Value Date/Time    Sodium 141 05/23/2018 08:24 AM    Potassium 3.3 (L) 05/23/2018 08:24 AM    Chloride 93 (L) 05/23/2018 08:24 AM    CO2 30 (H) 05/23/2018 08:24 AM    Anion gap 4 (L) 12/26/2014 05:04 PM    Glucose 139 (H) 05/23/2018 08:24 AM    BUN 14 05/23/2018 08:24 AM    Creatinine 1.26 (H) 05/23/2018 08:24 AM    BUN/Creatinine ratio 11 (L) 05/23/2018 08:24 AM    GFR est AA 49 (L) 05/23/2018 08:24 AM    GFR est non-AA 42 (L) 05/23/2018 08:24 AM    Calcium 9.9 05/23/2018 08:24 AM    Bilirubin, total 0.3 08/04/2017 11:44 AM    ALT (SGPT) 15 08/04/2017 11:44 AM    AST (SGOT) 20 08/04/2017 11:44 AM    Alk. phosphatase 62 08/04/2017 11:44 AM    Protein, total 7.6 08/04/2017 11:44 AM    Albumin 4.6 08/04/2017 11:44 AM    A-G Ratio 1.5 08/04/2017 11:44 AM      Lab Results   Component Value Date/Time    Hemoglobin A1c 6.2 (H) 06/26/2013 09:48 AM    Hemoglobin A1c (POC) 6.2 05/23/2018 08:24 AM         Review of Systems   Constitutional: Negative for malaise/fatigue. HENT: Negative for congestion. Eyes: Negative for blurred vision. Respiratory: Negative for cough and shortness of breath. Cardiovascular: Negative for chest pain, palpitations and leg swelling. Gastrointestinal: Negative for abdominal pain, constipation and heartburn. Genitourinary: Negative for dysuria, frequency and urgency. Musculoskeletal: Negative for back pain and joint pain. Neurological: Negative for dizziness, tingling and headaches. Endo/Heme/Allergies: Negative for environmental allergies. Psychiatric/Behavioral: Negative for depression. The patient does not have insomnia. Physical Exam   Constitutional: She appears well-developed and well-nourished.    /82 (BP 1 Location: Left arm, BP Patient Position: Sitting)   Pulse 60   Temp 97.9 °F (36.6 °C) (Oral)   Resp 18   Ht 5' 4.25\" (1.632 m)   Wt 219 lb 9.6 oz (99.6 kg)   LMP 08/15/1994   SpO2 100%   BMI 37.40 kg/m²      HENT:   Right Ear: Tympanic membrane and ear canal normal.   Left Ear: Tympanic membrane and ear canal normal.   Nose: No mucosal edema or rhinorrhea. Mouth/Throat: Oropharynx is clear and moist and mucous membranes are normal.   Neck: Normal range of motion. Neck supple. No thyromegaly present. Cardiovascular: Normal rate, regular rhythm and normal heart sounds. Exam reveals no gallop. Pulmonary/Chest: Effort normal and breath sounds normal.   Abdominal: Soft. Normal appearance and bowel sounds are normal. She exhibits no mass. There is no tenderness. Musculoskeletal: Normal range of motion. She exhibits edema (mild). Lymphadenopathy:     She has no cervical adenopathy. Skin: Skin is warm and dry. Psychiatric: She has a normal mood and affect. Nursing note and vitals reviewed. ASSESSMENT and PLAN  Diagnoses and all orders for this visit:    1. Essential hypertension  Discussed sodium restriction, high k rich diet, maintaining ideal body weight and regular exercise program such as daily walking 30 min perday 4-5 times per week, as physiologic means to achieve blood pressure control.  Medication compliance advised. 2. IGT (impaired glucose tolerance)  -     AMB POC HEMOGLOBIN A1C  -     AMB POC GLUCOSE, QUANTITATIVE, BLOOD    3. Hyperlipidemia, unspecified hyperlipidemia type  -     LIPID PANEL    4. Hypovitaminosis D  -     VITAMIN D, 25 HYDROXY    5. Encounter for medication monitoring  -     METABOLIC PANEL, COMPREHENSIVE  -     CBC W/O DIFF    6.  Colon cancer screening  -     OCCULT BLOOD IMMUNOASSAY,DIAGNOSTIC      Follow-up and Dispositions    · Return in about 4 months (around 11/3/2019) for medicare wellness exam.       reviewed diet, exercise and weight control  cardiovascular risk and specific lipid/LDL goals reviewed  reviewed medications and side effects in detail  specific diabetic recommendations: low cholesterol diet, weight control and daily exercise discussed and glycohemoglobin and other lab monitoring discussed     I have discussed diagnosis listed in this note with pt and/or family. I have discussed treatment plans and options and the risk/benefit analysis of those options, including safe use of medications and possible medication side effects. Through the use of shared decision making we have agreed to the above plan. The patient has received an after-visit summary and questions were answered concerning future plans and follow up. Advise pt of any urgent changes then to proceed to the ER.

## 2019-07-03 NOTE — PROGRESS NOTES
Chief Complaint   Patient presents with    Hypertension     follow up       Mammogram 4/17/2018    Bone density 4/17/2018    Pt states she has not had a colonoscopy. Patient states her last eye exam was about 2 years ago with Dr. Sreekanth Roldan at [facility]. 1. Have you been to the ER, urgent care clinic since your last visit? Hospitalized since your last visit? no    2. Have you seen or consulted any other health care providers outside of the [address] [place] [address] since your last visit? Include any pap smears or colon screening.  no

## 2019-07-04 LAB
25(OH)D3+25(OH)D2 SERPL-MCNC: 54.8 NG/ML (ref 30–100)
ALBUMIN SERPL-MCNC: 4.1 G/DL (ref 3.5–4.8)
ALBUMIN/GLOB SERPL: 1.2 {RATIO} (ref 1.2–2.2)
ALP SERPL-CCNC: 68 IU/L (ref 39–117)
ALT SERPL-CCNC: 16 IU/L (ref 0–32)
AST SERPL-CCNC: 18 IU/L (ref 0–40)
BILIRUB SERPL-MCNC: 0.3 MG/DL (ref 0–1.2)
BUN SERPL-MCNC: 9 MG/DL (ref 8–27)
BUN/CREAT SERPL: 10 (ref 12–28)
CALCIUM SERPL-MCNC: 9.7 MG/DL (ref 8.7–10.3)
CHLORIDE SERPL-SCNC: 103 MMOL/L (ref 96–106)
CHOLEST SERPL-MCNC: 195 MG/DL (ref 100–199)
CO2 SERPL-SCNC: 26 MMOL/L (ref 20–29)
CREAT SERPL-MCNC: 0.87 MG/DL (ref 0.57–1)
ERYTHROCYTE [DISTWIDTH] IN BLOOD BY AUTOMATED COUNT: 17.1 % (ref 12.3–15.4)
GLOBULIN SER CALC-MCNC: 3.3 G/DL (ref 1.5–4.5)
GLUCOSE SERPL-MCNC: 106 MG/DL (ref 65–99)
HCT VFR BLD AUTO: 40.9 % (ref 34–46.6)
HDLC SERPL-MCNC: 57 MG/DL
HGB BLD-MCNC: 13 G/DL (ref 11.1–15.9)
INTERPRETATION, 910389: NORMAL
LDLC SERPL CALC-MCNC: 119 MG/DL (ref 0–99)
MCH RBC QN AUTO: 23.8 PG (ref 26.6–33)
MCHC RBC AUTO-ENTMCNC: 31.8 G/DL (ref 31.5–35.7)
MCV RBC AUTO: 75 FL (ref 79–97)
PLATELET # BLD AUTO: 303 X10E3/UL (ref 150–450)
POTASSIUM SERPL-SCNC: 3.9 MMOL/L (ref 3.5–5.2)
PROT SERPL-MCNC: 7.4 G/DL (ref 6–8.5)
RBC # BLD AUTO: 5.46 X10E6/UL (ref 3.77–5.28)
SODIUM SERPL-SCNC: 145 MMOL/L (ref 134–144)
TRIGL SERPL-MCNC: 97 MG/DL (ref 0–149)
VLDLC SERPL CALC-MCNC: 19 MG/DL (ref 5–40)
WBC # BLD AUTO: 6.6 X10E3/UL (ref 3.4–10.8)

## 2019-09-06 LAB — HEMOCCULT STL QL IA: NEGATIVE

## 2019-10-21 ENCOUNTER — TELEPHONE (OUTPATIENT)
Dept: FAMILY MEDICINE CLINIC | Age: 74
End: 2019-10-21

## 2019-10-28 ENCOUNTER — CLINICAL SUPPORT (OUTPATIENT)
Dept: FAMILY MEDICINE CLINIC | Age: 74
End: 2019-10-28

## 2019-10-28 VITALS
SYSTOLIC BLOOD PRESSURE: 131 MMHG | WEIGHT: 216.8 LBS | BODY MASS INDEX: 37.01 KG/M2 | OXYGEN SATURATION: 94 % | HEIGHT: 64 IN | TEMPERATURE: 97.6 F | DIASTOLIC BLOOD PRESSURE: 82 MMHG | RESPIRATION RATE: 16 BRPM | HEART RATE: 67 BPM

## 2019-10-28 DIAGNOSIS — E87.6 HYPOKALEMIA: ICD-10-CM

## 2019-10-28 DIAGNOSIS — Z23 ENCOUNTER FOR IMMUNIZATION: Primary | ICD-10-CM

## 2019-10-28 RX ORDER — POTASSIUM CHLORIDE 750 MG/1
10 TABLET, FILM COATED, EXTENDED RELEASE ORAL 2 TIMES WEEKLY
Qty: 45 TAB | Refills: 3 | Status: SHIPPED | OUTPATIENT
Start: 2019-10-28 | End: 2020-11-09

## 2019-10-28 NOTE — PROGRESS NOTES
Order placed for flu shot per Verbal Order from Dr. Diaz Jasso on 10/28/2019 due to need. Flu shot 0.5 ml given in left arm IM, no reaction noted.

## 2019-11-25 NOTE — PROGRESS NOTES
HISTORY OF PRESENT ILLNESS  Cristopher Fontenot is a 75 y.o. female. HPI   Follow up on chronic medical problems. Overall feeling well. Cardiovascular Review:  The patient has hypertension and hyperlipidemia. Tolerating Norvasc very well. No chest pain and no SOB. Diet and Lifestyle: generally follows a low fat low cholesterol diet, generally follows a low sodium diet, exercises regularly  Home BP Monitoring: Ranging 130-150s/80s. This has been higher over the last few months. Pertinent ROS: taking medications as instructed, no TIA's, no chest pain on exertion, no dyspnea on exertion, noting some slight swelling of ankles that comes and goes. Glucose intolerance reveiw:  She has IGT. Diabetic ROS - further diabetic ROS: no polyuria or polydipsia, no chest pain, dyspnea or TIA's, no numbness, tingling or pain in extremities, no unusual visual symptoms. Lab review: orders written for new lab studies as appropriate; see orders. Patient Active Problem List   Diagnosis Code    Hypovitaminosis D E55.9    Hyperlipidemia E78.5    Asymptomatic PVCs/APC's I49.3    IGT (impaired glucose tolerance) R73.02    Essential hypertension I10    Encounter for medication monitoring Z51.81    Severe obesity (BMI 35.0-39. 9) with comorbidity (HCC) E66.01       Current Outpatient Medications   Medication Sig Dispense Refill    potassium chloride SR (KLOR-CON 10) 10 mEq tablet Take 1 Tab by mouth two (2) times a week. 45 Tab 3    allopurinol (ZYLOPRIM) 100 mg tablet TAKE 1 TABLET BY MOUTH DAILY 90 Tab 3    amLODIPine (NORVASC) 5 mg tablet TAKE 1 TABLET BY MOUTH DAILY 90 Tab 3    triamterene-hydroCHLOROthiazide (DYAZIDE) 37.5-25 mg per capsule Take 1 Cap by mouth every other day. 90 Cap 3    ibuprofen (ADVIL) 200 mg tablet Take 200 mg by mouth every six (6) hours as needed for Pain.  chlorpheniramine-dm (CORICIDIN HBP COUGH AND COLD) 4-30 mg tab Take 1 Tab by mouth daily as needed.       cholecalciferol, vitamin D3, (VITAMIN D3) 2,000 unit tab Take 2,000 Units by mouth daily.  ferrous sulfate 325 mg (65 mg iron) tablet Take 325 mg by mouth two (2) times a week.  aspirin 81 mg tablet Take 1 Tab by mouth daily. Allergies   Allergen Reactions    Triamterene Swelling and Other (comments)     Diazide is ok. ...just it's generic.  Prinzide [Lisinopril-Hydrochlorothiazide] Shortness of Breath     fatigue         Past Medical History:   Diagnosis Date    Gout     Hypertension          Past Surgical History:   Procedure Laterality Date    HX BREAST BIOPSY Right     benign bx age 41's   [facility] DILATION AND CURETTAGE      [place] COLONOSCOPY FLX DX W/COLLJ SPEC WHEN PFRMD      \"long time ago\"         Family History   Problem Relation Age of Onset    Anemia Mother     Heart Failure Mother     Heart Failure Father     Dementia Father         alzheimers    No Known Problems Sister     No Known Problems Brother     No Known Problems Brother     No Known Problems Brother     Heart Attack Sister     No Known Problems Brother     No Known Problems Sister        Social History     Tobacco Use    Smoking status: Never Smoker    Smokeless tobacco: Never Used   Substance Use Topics    Alcohol use:  Yes     Alcohol/week: 0.0 standard drinks     Comment: rarely        Lab Results   Component Value Date/Time    WBC 6.6 07/03/2019 09:19 AM    HGB 13.0 07/03/2019 09:19 AM    Hemoglobin (POC) 15.3 12/26/2014 05:11 PM    HCT 40.9 07/03/2019 09:19 AM    Hematocrit (POC) 45 12/26/2014 05:11 PM    PLATELET 249 82/26/7739 09:19 AM    MCV 75 (L) 07/03/2019 09:19 AM     Lab Results   Component Value Date/Time    Cholesterol, total 195 07/03/2019 09:19 AM    HDL Cholesterol 57 07/03/2019 09:19 AM    LDL, calculated 119 (H) 07/03/2019 09:19 AM    Triglyceride 97 07/03/2019 09:19 AM     Lab Results   Component Value Date/Time    TSH 0.876 10/15/2014 09:30 AM    T4, Free 1.14 10/15/2014 09:30 AM      Lab Results   Component Value Date/Time    Sodium 145 (H) 07/03/2019 09:19 AM    Potassium 3.9 07/03/2019 09:19 AM    Chloride 103 07/03/2019 09:19 AM    CO2 26 07/03/2019 09:19 AM    Anion gap 4 (L) 12/26/2014 05:04 PM    Glucose 106 (H) 07/03/2019 09:19 AM    BUN 9 07/03/2019 09:19 AM    Creatinine 0.87 07/03/2019 09:19 AM    BUN/Creatinine ratio 10 (L) 07/03/2019 09:19 AM    GFR est AA 76 07/03/2019 09:19 AM    GFR est non-AA 66 07/03/2019 09:19 AM    Calcium 9.7 07/03/2019 09:19 AM    Bilirubin, total 0.3 07/03/2019 09:19 AM    ALT (SGPT) 16 07/03/2019 09:19 AM    AST (SGOT) 18 07/03/2019 09:19 AM    Alk. phosphatase 68 07/03/2019 09:19 AM    Protein, total 7.4 07/03/2019 09:19 AM    Albumin 4.1 07/03/2019 09:19 AM    A-G Ratio 1.2 07/03/2019 09:19 AM      Lab Results   Component Value Date/Time    Hemoglobin A1c 6.2 (H) 06/26/2013 09:48 AM    Hemoglobin A1c (POC) 5.9 07/03/2019 09:19 AM         Review of Systems   Constitutional: Negative for malaise/fatigue. HENT: Negative for congestion. Eyes: Negative for blurred vision. Respiratory: Negative for cough and shortness of breath. Cardiovascular: Negative for chest pain, palpitations and leg swelling. Gastrointestinal: Negative for abdominal pain, constipation and heartburn. Genitourinary: Negative for dysuria, frequency and urgency. Musculoskeletal: Negative for back pain and joint pain. Neurological: Negative for dizziness, tingling and headaches. Endo/Heme/Allergies: Negative for environmental allergies. Psychiatric/Behavioral: Negative for depression. The patient does not have insomnia. Physical Exam   Constitutional: She appears well-developed and well-nourished.    /70 (BP 1 Location: Left arm, BP Patient Position: Sitting)   Pulse 70   Temp 97.1 °F (36.2 °C) (Oral)   Resp 16   Ht 5' 4.5\" (1.638 m)   Wt 219 lb (99.3 kg)   LMP 08/15/1994   SpO2 97%   BMI 37.01 kg/m²    HENT:   Right Ear: Tympanic membrane and ear canal normal.   Left Ear: Tympanic membrane and ear canal normal.   Nose: No mucosal edema or rhinorrhea. Mouth/Throat: Oropharynx is clear and moist and mucous membranes are normal.   Neck: Normal range of motion. Neck supple. Carotid bruit is not present. No thyromegaly present. Cardiovascular: Normal rate, regular rhythm and normal heart sounds. Exam reveals no gallop. Pulmonary/Chest: Effort normal and breath sounds normal. Right breast exhibits no inverted nipple, no mass, no nipple discharge, no skin change and no tenderness. Left breast exhibits no inverted nipple, no mass, no nipple discharge, no skin change and no tenderness. Abdominal: Soft. Normal appearance and bowel sounds are normal. She exhibits no mass. There is no tenderness. Genitourinary: Rectum:      Guaiac result negative. Musculoskeletal: Normal range of motion. General: No edema. Lymphadenopathy:     She has no cervical adenopathy. Skin: Skin is warm and dry. Psychiatric: She has a normal mood and affect. Nursing note and vitals reviewed. ASSESSMENT and PLAN  Diagnoses and all orders for this visit:    1. Medicare annual wellness visit, subsequent  -     AMB POC URINALYSIS DIP STICK AUTO W/ MICRO    2. Essential hypertension  -     Increase amLODIPine (NORVASC) 5 mg tablet; Take 2 Tabs by mouth daily. Discussed sodium restriction, high k rich diet, maintaining ideal body weight and regular exercise program such as daily walking 30 min perday 4-5 times per week, as physiologic means to achieve blood pressure control.  Medication compliance advised. 3. IGT (impaired glucose tolerance)  -     AMB POC HEMOGLOBIN A1C  -     AMB POC GLUCOSE, QUANTITATIVE, BLOOD    4. Hyperlipidemia, unspecified hyperlipidemia type  -     LIPID PANEL    5. Hypovitaminosis D  -     VITAMIN D, 25 HYDROXY    6. Encounter for medication monitoring  -     METABOLIC PANEL, BASIC    7.  Non morbid obesity  I have reviewed/discussed the above normal BMI with the patient. I have recommended the following interventions: dietary management education, guidance, and counseling . 8. Encounter for screening mammogram for breast cancer  -     [facility] MAMMO BI SCREENING INCL CAD; Future      Follow-up and Dispositions    · Return in about 3 months (around 2/26/2020). reviewed diet, exercise and weight control  cardiovascular risk and specific lipid/LDL goals reviewed  reviewed medications and side effects in detail  specific diabetic recommendations: low cholesterol diet, weight control and daily exercise discussed and glycohemoglobin and other lab monitoring discussed     I have discussed diagnosis listed in this note with pt and/or family. I have discussed treatment plans and options and the risk/benefit analysis of those options, including safe use of medications and possible medication side effects. Through the use of shared decision making we have agreed to the above plan. The patient has received an after-visit summary and questions were answered concerning future plans and follow up. Advise pt of any urgent changes then to proceed to the ER.

## 2019-11-26 ENCOUNTER — OFFICE VISIT (OUTPATIENT)
Dept: FAMILY MEDICINE CLINIC | Age: 74
End: 2019-11-26

## 2019-11-26 VITALS
HEART RATE: 70 BPM | BODY MASS INDEX: 36.49 KG/M2 | DIASTOLIC BLOOD PRESSURE: 70 MMHG | SYSTOLIC BLOOD PRESSURE: 135 MMHG | HEIGHT: 65 IN | WEIGHT: 219 LBS | RESPIRATION RATE: 16 BRPM | TEMPERATURE: 97.1 F | OXYGEN SATURATION: 97 %

## 2019-11-26 DIAGNOSIS — Z00.00 MEDICARE ANNUAL WELLNESS VISIT, SUBSEQUENT: Primary | ICD-10-CM

## 2019-11-26 DIAGNOSIS — I10 ESSENTIAL HYPERTENSION: ICD-10-CM

## 2019-11-26 DIAGNOSIS — Z51.81 ENCOUNTER FOR MEDICATION MONITORING: ICD-10-CM

## 2019-11-26 DIAGNOSIS — E78.5 HYPERLIPIDEMIA, UNSPECIFIED HYPERLIPIDEMIA TYPE: ICD-10-CM

## 2019-11-26 DIAGNOSIS — E66.9 NON MORBID OBESITY: ICD-10-CM

## 2019-11-26 DIAGNOSIS — R73.02 IGT (IMPAIRED GLUCOSE TOLERANCE): ICD-10-CM

## 2019-11-26 DIAGNOSIS — E55.9 HYPOVITAMINOSIS D: ICD-10-CM

## 2019-11-26 DIAGNOSIS — Z12.31 ENCOUNTER FOR SCREENING MAMMOGRAM FOR BREAST CANCER: ICD-10-CM

## 2019-11-26 DIAGNOSIS — Z79.82 LONG TERM (CURRENT) USE OF ASPIRIN: ICD-10-CM

## 2019-11-26 LAB
BILIRUB UR QL STRIP: NEGATIVE
GLUCOSE POC: 119 MG/DL
GLUCOSE UR-MCNC: NEGATIVE MG/DL
HBA1C MFR BLD HPLC: 6.1 %
HEMOCCULT STL QL: NEGATIVE
KETONES P FAST UR STRIP-MCNC: NEGATIVE MG/DL
PH UR STRIP: 7 [PH] (ref 4.6–8)
PROT UR QL STRIP: NORMAL
SP GR UR STRIP: 1.01 (ref 1–1.03)
UA UROBILINOGEN AMB POC: NORMAL (ref 0.2–1)
URINALYSIS CLARITY POC: CLEAR
URINALYSIS COLOR POC: YELLOW
URINE BLOOD POC: NORMAL
URINE LEUKOCYTES POC: NEGATIVE
URINE NITRITES POC: NEGATIVE
VALID INTERNAL CONTROL?: YES

## 2019-11-26 RX ORDER — ALLOPURINOL 100 MG/1
100 TABLET ORAL
COMMUNITY
End: 2021-02-16 | Stop reason: SDUPTHER

## 2019-11-26 RX ORDER — AMLODIPINE BESYLATE 5 MG/1
10 TABLET ORAL DAILY
Qty: 180 TAB | Refills: 3 | Status: SHIPPED | OUTPATIENT
Start: 2019-11-26 | End: 2021-02-05

## 2019-11-26 RX ORDER — AMLODIPINE BESYLATE 5 MG/1
10 TABLET ORAL DAILY
Qty: 180 TAB | Refills: 3 | Status: SHIPPED | OUTPATIENT
Start: 2019-11-26 | End: 2019-11-26 | Stop reason: SDUPTHER

## 2019-11-26 NOTE — PROGRESS NOTES
Chief Complaint   Patient presents with   [employer] Wellness Visit     Medicare         Mammogram 4/17/2018    Bone density 4/17/2018    FIT test 8/29/2019 which was negative. Patient states her last eye exam was about 2 years ago with Dr. Papito Robertson at [facility]. 1. Have you been to the ER, urgent care clinic since your last visit? Hospitalized since your last visit? no    2. Have you seen or consulted any other health care providers outside of the [address] [place] [address] since your last visit? Include any pap smears or colon screening.  no

## 2019-11-27 LAB
25(OH)D3+25(OH)D2 SERPL-MCNC: 65 NG/ML (ref 30–100)
BUN SERPL-MCNC: 12 MG/DL (ref 8–27)
BUN/CREAT SERPL: 16 (ref 12–28)
CALCIUM SERPL-MCNC: 10.1 MG/DL (ref 8.7–10.3)
CHLORIDE SERPL-SCNC: 96 MMOL/L (ref 96–106)
CHOLEST SERPL-MCNC: 207 MG/DL (ref 100–199)
CO2 SERPL-SCNC: 29 MMOL/L (ref 20–29)
CREAT SERPL-MCNC: 0.77 MG/DL (ref 0.57–1)
GLUCOSE SERPL-MCNC: 114 MG/DL (ref 65–99)
HDLC SERPL-MCNC: 63 MG/DL
INTERPRETATION, 910389: NORMAL
LDLC SERPL CALC-MCNC: 125 MG/DL (ref 0–99)
POTASSIUM SERPL-SCNC: 3.8 MMOL/L (ref 3.5–5.2)
SODIUM SERPL-SCNC: 141 MMOL/L (ref 134–144)
TRIGL SERPL-MCNC: 97 MG/DL (ref 0–149)
VLDLC SERPL CALC-MCNC: 19 MG/DL (ref 5–40)

## 2019-12-10 ENCOUNTER — HOSPITAL ENCOUNTER (OUTPATIENT)
Dept: MAMMOGRAPHY | Age: 74
Discharge: HOME | End: 2019-12-10
Attending: FAMILY MEDICINE
Payer: MEDICARE

## 2019-12-10 DIAGNOSIS — Z12.31 ENCOUNTER FOR SCREENING MAMMOGRAM FOR BREAST CANCER: ICD-10-CM

## 2019-12-10 PROCEDURE — 77067 SCR MAMMO BI INCL CAD: CPT

## 2020-02-26 ENCOUNTER — OFFICE VISIT (OUTPATIENT)
Dept: FAMILY MEDICINE CLINIC | Age: 75
End: 2020-02-26

## 2020-02-26 VITALS
WEIGHT: 217.2 LBS | SYSTOLIC BLOOD PRESSURE: 124 MMHG | HEIGHT: 65 IN | TEMPERATURE: 96.5 F | OXYGEN SATURATION: 98 % | DIASTOLIC BLOOD PRESSURE: 67 MMHG | BODY MASS INDEX: 36.19 KG/M2 | HEART RATE: 72 BPM | RESPIRATION RATE: 18 BRPM

## 2020-02-26 DIAGNOSIS — R73.02 IGT (IMPAIRED GLUCOSE TOLERANCE): ICD-10-CM

## 2020-02-26 DIAGNOSIS — Z51.81 ENCOUNTER FOR MEDICATION MONITORING: ICD-10-CM

## 2020-02-26 DIAGNOSIS — I10 ESSENTIAL HYPERTENSION: Primary | ICD-10-CM

## 2020-02-26 DIAGNOSIS — E78.5 HYPERLIPIDEMIA, UNSPECIFIED HYPERLIPIDEMIA TYPE: ICD-10-CM

## 2020-02-26 DIAGNOSIS — E66.9 NON MORBID OBESITY: ICD-10-CM

## 2020-02-26 LAB
GLUCOSE POC: 95 MG/DL
HBA1C MFR BLD HPLC: 5.9 %

## 2020-02-26 NOTE — PROGRESS NOTES
Chief Complaint   Patient presents with    Hypertension     follow up    Cholesterol Problem     follow up    Ear Fullness     patient c/o right ear feels clogged         Mammogram 4/17/2018    Bone density 4/17/2018    FIT test 8/29/2019 which was negative. Patient states her last eye exam was about 2 years ago with Dr. Sally Burgess at [facility]. 1. Have you been to the ER, urgent care clinic since your last visit? Hospitalized since your last visit? no    2. Have you seen or consulted any other health care providers outside of the [address] [place] [address] since your last visit? Include any pap smears or colon screening.  no

## 2020-02-26 NOTE — PROGRESS NOTES
HISTORY OF PRESENT ILLNESS  Robert Lozada is a 75 y.o. female. HPI   Follow up on chronic medical problems. Overall feeling well. Cardiovascular Review:  The patient has hypertension and hyperlipidemia. Tolerating Norvasc very well. No chest pain and no SOB. Diet and Lifestyle: generally follows a low fat low cholesterol diet, generally follows a low sodium diet, exercises regularly  Home BP Monitoring: Ranging 120s/80s. Pertinent ROS: taking medications as instructed, no TIA's, no chest pain on exertion, no dyspnea on exertion, noting some slight swelling of ankles that comes and goes. Glucose intolerance reveiw:  She has IGT. Diabetic ROS - further diabetic ROS: no polyuria or polydipsia, no chest pain, dyspnea or TIA's, no numbness, tingling or pain in extremities, no unusual visual symptoms. Lab review: orders written for new lab studies as appropriate; see orders. Patient Active Problem List   Diagnosis Code    Hypovitaminosis D E55.9    Hyperlipidemia E78.5    Asymptomatic PVCs/APC's I49.3    IGT (impaired glucose tolerance) R73.02    Essential hypertension I10    Encounter for medication monitoring Z51.81    Severe obesity (BMI 35.0-39. 9) with comorbidity (HCC) E66.01       Current Outpatient Medications   Medication Sig Dispense Refill    allopurinol (ZYLOPRIM) 100 mg tablet Take 100 mg by mouth daily as needed.  amLODIPine (NORVASC) 5 mg tablet Take 2 Tabs by mouth daily. 180 Tab 3    potassium chloride SR (KLOR-CON 10) 10 mEq tablet Take 1 Tab by mouth two (2) times a week. 45 Tab 3    triamterene-hydroCHLOROthiazide (DYAZIDE) 37.5-25 mg per capsule Take 1 Cap by mouth every other day. 90 Cap 3    ibuprofen (ADVIL) 200 mg tablet Take 200 mg by mouth every six (6) hours as needed for Pain.  chlorpheniramine-dm (CORICIDIN HBP COUGH AND COLD) 4-30 mg tab Take 1 Tab by mouth daily as needed.       cholecalciferol, vitamin D3, (VITAMIN D3) 2,000 unit tab Take 2,000 Units by mouth daily.  ferrous sulfate 325 mg (65 mg iron) tablet Take 325 mg by mouth two (2) times a week.  aspirin 81 mg tablet Take 1 Tab by mouth daily. Allergies   Allergen Reactions    Triamterene Swelling and Other (comments)     Diazide is ok. ...just it's generic.  Prinzide [Lisinopril-Hydrochlorothiazide] Shortness of Breath     fatigue         Past Medical History:   Diagnosis Date    Gout     Hypertension          Past Surgical History:   Procedure Laterality Date    HX BREAST BIOPSY Right     benign bx age 41's   [facility] DILATION AND CURETTAGE      [place] COLONOSCOPY FLX DX W/COLLJ SPEC WHEN PFRMD      \"long time ago\"         Family History   Problem Relation Age of Onset    Anemia Mother     Heart Failure Mother     Heart Failure Father     Dementia Father         alzheimers    No Known Problems Sister     No Known Problems Brother     No Known Problems Brother     No Known Problems Brother     Heart Attack Sister     No Known Problems Brother     No Known Problems Sister        Social History     Tobacco Use    Smoking status: Never Smoker    Smokeless tobacco: Never Used   Substance Use Topics    Alcohol use:  Yes     Alcohol/week: 0.0 standard drinks     Comment: rarely        Lab Results   Component Value Date/Time    WBC 6.6 07/03/2019 09:19 AM    HGB 13.0 07/03/2019 09:19 AM    Hemoglobin (POC) 15.3 12/26/2014 05:11 PM    HCT 40.9 07/03/2019 09:19 AM    Hematocrit (POC) 45 12/26/2014 05:11 PM    PLATELET 814 56/82/9912 09:19 AM    MCV 75 (L) 07/03/2019 09:19 AM     Lab Results   Component Value Date/Time    Cholesterol, total 207 (H) 11/26/2019 10:17 AM    HDL Cholesterol 63 11/26/2019 10:17 AM    LDL, calculated 125 (H) 11/26/2019 10:17 AM    Triglyceride 97 11/26/2019 10:17 AM     Lab Results   Component Value Date/Time    TSH 0.876 10/15/2014 09:30 AM    T4, Free 1.14 10/15/2014 09:30 AM      Lab Results   Component Value Date/Time    Sodium 141 11/26/2019 10:17 AM    Potassium 3.8 11/26/2019 10:17 AM    Chloride 96 11/26/2019 10:17 AM    CO2 29 11/26/2019 10:17 AM    Anion gap 4 (L) 12/26/2014 05:04 PM    Glucose 114 (H) 11/26/2019 10:17 AM    BUN 12 11/26/2019 10:17 AM    Creatinine 0.77 11/26/2019 10:17 AM    BUN/Creatinine ratio 16 11/26/2019 10:17 AM    GFR est AA 87 11/26/2019 10:17 AM    GFR est non-AA 76 11/26/2019 10:17 AM    Calcium 10.1 11/26/2019 10:17 AM    Bilirubin, total 0.3 07/03/2019 09:19 AM    ALT (SGPT) 16 07/03/2019 09:19 AM    AST (SGOT) 18 07/03/2019 09:19 AM    Alk. phosphatase 68 07/03/2019 09:19 AM    Protein, total 7.4 07/03/2019 09:19 AM    Albumin 4.1 07/03/2019 09:19 AM    A-G Ratio 1.2 07/03/2019 09:19 AM      Lab Results   Component Value Date/Time    Hemoglobin A1c 6.2 (H) 06/26/2013 09:48 AM    Hemoglobin A1c (POC) 6.1 11/26/2019 08:10 AM         Review of Systems   Constitutional: Negative for malaise/fatigue. HENT: Negative for congestion. Eyes: Negative for blurred vision. Respiratory: Negative for cough and shortness of breath. Cardiovascular: Negative for chest pain, palpitations and leg swelling. Gastrointestinal: Negative for abdominal pain, constipation and heartburn. Genitourinary: Negative for dysuria, frequency and urgency. Musculoskeletal: Negative for back pain and joint pain. Neurological: Negative for dizziness, tingling and headaches. Endo/Heme/Allergies: Negative for environmental allergies. Psychiatric/Behavioral: Negative for depression. The patient does not have insomnia. Physical Exam   Constitutional: She appears well-developed and well-nourished.    /67 (BP 1 Location: Left arm, BP Patient Position: Sitting)   Pulse 72   Temp 96.5 °F (35.8 °C) (Oral)   Resp 18   Ht 5' 4.5\" (1.638 m)   Wt 217 lb 3.2 oz (98.5 kg)   LMP 08/15/1994   SpO2 98%   BMI 36.71 kg/m²        HENT:   Right Ear: Tympanic membrane and ear canal normal.   Left Ear: Tympanic membrane and ear canal normal.   Nose: No mucosal edema or rhinorrhea. Mouth/Throat: Oropharynx is clear and moist and mucous membranes are normal.   Neck: Normal range of motion. Neck supple. No thyromegaly present. Cardiovascular: Normal rate, regular rhythm and normal heart sounds. Exam reveals no gallop. Pulmonary/Chest: Effort normal and breath sounds normal.   Abdominal: Soft. Normal appearance and bowel sounds are normal. She exhibits no mass. There is no abdominal tenderness. Musculoskeletal: Normal range of motion. General: Edema (mild) present. Lymphadenopathy:     She has no cervical adenopathy. Skin: Skin is warm and dry. Psychiatric: She has a normal mood and affect. Nursing note and vitals reviewed. ASSESSMENT and PLAN  Diagnoses and all orders for this visit:    1. Essential hypertension  Discussed sodium restriction, high k rich diet, maintaining ideal body weight and regular exercise program such as daily walking 30 min perday 4-5 times per week, as physiologic means to achieve blood pressure control.  Medication compliance advised. 2. Hyperlipidemia, unspecified hyperlipidemia type  -     LIPID PANEL    3. IGT (impaired glucose tolerance)  -     AMB POC HEMOGLOBIN A1C  -     AMB POC GLUCOSE, QUANTITATIVE, BLOOD    4. Encounter for medication monitoring  -     METABOLIC PANEL, COMPREHENSIVE    5. Non morbid obesity  Discussed sodium restriction, high k rich diet, maintaining ideal body weight and regular exercise program such as daily walking 30 min perday 4-5 times per week, as physiologic means to achieve blood pressure control.  Medication compliance advised. Follow-up and Dispositions    · Return in about 5 months (around 7/26/2020).        reviewed diet, exercise and weight control  cardiovascular risk and specific lipid/LDL goals reviewed  reviewed medications and side effects in detail  specific diabetic recommendations: low cholesterol diet, weight control and daily exercise discussed and glycohemoglobin and other lab monitoring discussed     I have discussed diagnosis listed in this note with pt and/or family. I have discussed treatment plans and options and the risk/benefit analysis of those options, including safe use of medications and possible medication side effects. Through the use of shared decision making we have agreed to the above plan. The patient has received an after-visit summary and questions were answered concerning future plans and follow up. Advise pt of any urgent changes then to proceed to the ER.

## 2020-02-27 LAB
ALBUMIN SERPL-MCNC: 4.6 G/DL (ref 3.7–4.7)
ALBUMIN/GLOB SERPL: 1.6 {RATIO} (ref 1.2–2.2)
ALP SERPL-CCNC: 96 IU/L (ref 39–117)
ALT SERPL-CCNC: 21 IU/L (ref 0–32)
AST SERPL-CCNC: 21 IU/L (ref 0–40)
BILIRUB SERPL-MCNC: <0.2 MG/DL (ref 0–1.2)
BUN SERPL-MCNC: 16 MG/DL (ref 8–27)
BUN/CREAT SERPL: 17 (ref 12–28)
CALCIUM SERPL-MCNC: 10.4 MG/DL (ref 8.7–10.3)
CHLORIDE SERPL-SCNC: 100 MMOL/L (ref 96–106)
CHOLEST SERPL-MCNC: 194 MG/DL (ref 100–199)
CO2 SERPL-SCNC: 28 MMOL/L (ref 20–29)
CREAT SERPL-MCNC: 0.95 MG/DL (ref 0.57–1)
GLOBULIN SER CALC-MCNC: 2.9 G/DL (ref 1.5–4.5)
GLUCOSE SERPL-MCNC: 100 MG/DL (ref 65–99)
HDLC SERPL-MCNC: 66 MG/DL
INTERPRETATION, 910389: NORMAL
INTERPRETATION: NORMAL
LDLC SERPL CALC-MCNC: 110 MG/DL (ref 0–99)
PDF IMAGE, 910387: NORMAL
POTASSIUM SERPL-SCNC: 3.8 MMOL/L (ref 3.5–5.2)
PROT SERPL-MCNC: 7.5 G/DL (ref 6–8.5)
SODIUM SERPL-SCNC: 144 MMOL/L (ref 134–144)
TRIGL SERPL-MCNC: 89 MG/DL (ref 0–149)
VLDLC SERPL CALC-MCNC: 18 MG/DL (ref 5–40)

## 2020-05-06 DIAGNOSIS — I10 ESSENTIAL HYPERTENSION: ICD-10-CM

## 2020-05-07 RX ORDER — TRIAMTERENE AND HYDROCHLOROTHIAZIDE 37.5; 25 MG/1; MG/1
CAPSULE ORAL
Qty: 45 CAP | Refills: 3 | Status: SHIPPED | OUTPATIENT
Start: 2020-05-07 | End: 2021-02-16 | Stop reason: SDUPTHER

## 2020-09-23 ENCOUNTER — CLINICAL SUPPORT (OUTPATIENT)
Dept: FAMILY MEDICINE CLINIC | Age: 75
End: 2020-09-23
Payer: MEDICARE

## 2020-09-23 VITALS
OXYGEN SATURATION: 97 % | DIASTOLIC BLOOD PRESSURE: 76 MMHG | TEMPERATURE: 96 F | HEART RATE: 81 BPM | SYSTOLIC BLOOD PRESSURE: 135 MMHG

## 2020-09-23 DIAGNOSIS — Z23 NEEDS FLU SHOT: Primary | ICD-10-CM

## 2020-09-23 PROCEDURE — 90653 IIV ADJUVANT VACCINE IM: CPT

## 2020-09-23 PROCEDURE — G0008 ADMIN INFLUENZA VIRUS VAC: HCPCS

## 2020-09-23 NOTE — PROGRESS NOTES
Chief Complaint   Patient presents with    Immunization/Injection         Here for annual flu shot. Given in right deltoid and tolerated well with no adverse reactions.   Verbal orders received and read back to dr. Roxie Coffman

## 2020-11-09 DIAGNOSIS — E87.6 HYPOKALEMIA: ICD-10-CM

## 2020-11-09 RX ORDER — POTASSIUM CHLORIDE 750 MG/1
TABLET, FILM COATED, EXTENDED RELEASE ORAL
Qty: 26 TAB | Refills: 0 | Status: SHIPPED | OUTPATIENT
Start: 2020-11-09 | End: 2021-01-11

## 2021-01-11 DIAGNOSIS — E87.6 HYPOKALEMIA: ICD-10-CM

## 2021-01-11 RX ORDER — POTASSIUM CHLORIDE 750 MG/1
TABLET, FILM COATED, EXTENDED RELEASE ORAL
Qty: 24 TAB | Refills: 0 | Status: SHIPPED | OUTPATIENT
Start: 2021-01-11 | End: 2021-03-18

## 2021-02-16 ENCOUNTER — OFFICE VISIT (OUTPATIENT)
Dept: FAMILY MEDICINE CLINIC | Age: 76
End: 2021-02-16
Payer: MEDICARE

## 2021-02-16 VITALS
DIASTOLIC BLOOD PRESSURE: 74 MMHG | BODY MASS INDEX: 37.76 KG/M2 | SYSTOLIC BLOOD PRESSURE: 120 MMHG | OXYGEN SATURATION: 99 % | HEIGHT: 64 IN | HEART RATE: 66 BPM | WEIGHT: 221.2 LBS | TEMPERATURE: 96.9 F | RESPIRATION RATE: 18 BRPM

## 2021-02-16 DIAGNOSIS — Z12.11 COLON CANCER SCREENING: ICD-10-CM

## 2021-02-16 DIAGNOSIS — E66.9 NON MORBID OBESITY: ICD-10-CM

## 2021-02-16 DIAGNOSIS — R73.02 IGT (IMPAIRED GLUCOSE TOLERANCE): ICD-10-CM

## 2021-02-16 DIAGNOSIS — Z51.81 ENCOUNTER FOR MEDICATION MONITORING: ICD-10-CM

## 2021-02-16 DIAGNOSIS — E78.2 MIXED HYPERLIPIDEMIA: ICD-10-CM

## 2021-02-16 DIAGNOSIS — Z00.00 MEDICARE ANNUAL WELLNESS VISIT, SUBSEQUENT: Primary | ICD-10-CM

## 2021-02-16 DIAGNOSIS — Z79.82 ENCOUNTER FOR LONG-TERM (CURRENT) USE OF ASPIRIN: ICD-10-CM

## 2021-02-16 DIAGNOSIS — I10 ESSENTIAL HYPERTENSION: ICD-10-CM

## 2021-02-16 DIAGNOSIS — Z12.31 ENCOUNTER FOR SCREENING MAMMOGRAM FOR BREAST CANCER: ICD-10-CM

## 2021-02-16 LAB
HEMOCCULT STL QL: NEGATIVE
VALID INTERNAL CONTROL?: YES

## 2021-02-16 PROCEDURE — G8752 SYS BP LESS 140: HCPCS | Performed by: FAMILY MEDICINE

## 2021-02-16 PROCEDURE — G8536 NO DOC ELDER MAL SCRN: HCPCS | Performed by: FAMILY MEDICINE

## 2021-02-16 PROCEDURE — 1101F PT FALLS ASSESS-DOCD LE1/YR: CPT | Performed by: FAMILY MEDICINE

## 2021-02-16 PROCEDURE — G8510 SCR DEP NEG, NO PLAN REQD: HCPCS | Performed by: FAMILY MEDICINE

## 2021-02-16 PROCEDURE — G0439 PPPS, SUBSEQ VISIT: HCPCS | Performed by: FAMILY MEDICINE

## 2021-02-16 PROCEDURE — 99214 OFFICE O/P EST MOD 30 MIN: CPT | Performed by: FAMILY MEDICINE

## 2021-02-16 PROCEDURE — 93000 ELECTROCARDIOGRAM COMPLETE: CPT | Performed by: FAMILY MEDICINE

## 2021-02-16 PROCEDURE — G8417 CALC BMI ABV UP PARAM F/U: HCPCS | Performed by: FAMILY MEDICINE

## 2021-02-16 PROCEDURE — 1090F PRES/ABSN URINE INCON ASSESS: CPT | Performed by: FAMILY MEDICINE

## 2021-02-16 PROCEDURE — G8427 DOCREV CUR MEDS BY ELIG CLIN: HCPCS | Performed by: FAMILY MEDICINE

## 2021-02-16 PROCEDURE — 82272 OCCULT BLD FECES 1-3 TESTS: CPT | Performed by: FAMILY MEDICINE

## 2021-02-16 PROCEDURE — G8399 PT W/DXA RESULTS DOCUMENT: HCPCS | Performed by: FAMILY MEDICINE

## 2021-02-16 PROCEDURE — G8754 DIAS BP LESS 90: HCPCS | Performed by: FAMILY MEDICINE

## 2021-02-16 RX ORDER — AMLODIPINE BESYLATE 5 MG/1
TABLET ORAL
Qty: 180 TAB | Refills: 0
Start: 2021-02-16 | End: 2021-04-02

## 2021-02-16 RX ORDER — TRIAMTERENE AND HYDROCHLOROTHIAZIDE 37.5; 25 MG/1; MG/1
CAPSULE ORAL
Qty: 45 CAP | Refills: 3
Start: 2021-02-16 | End: 2021-03-18

## 2021-02-16 RX ORDER — ALLOPURINOL 100 MG/1
100 TABLET ORAL DAILY
COMMUNITY
Start: 2021-02-16 | End: 2021-02-16 | Stop reason: SDUPTHER

## 2021-02-16 RX ORDER — ALLOPURINOL 100 MG/1
100 TABLET ORAL
COMMUNITY
Start: 2021-02-16 | End: 2022-08-22 | Stop reason: SDUPTHER

## 2021-02-16 NOTE — PROGRESS NOTES
HISTORY OF PRESENT ILLNESS  Danny Bailon is a 67 y.o. female. HPI   Follow up on chronic medical problems. Overall feeling well. Doing the precautionary measures at home to reduce risks of exposure COVID19. Also wearing mask when she is going out. No known sick contacts or known exposure to [address]. Cardiovascular Review:  The patient has hypertension and hyperlipidemia. Tolerating Norvasc very well. No chest pain and no SOB. Diet and Lifestyle: generally follows a low fat low cholesterol diet, generally follows a low sodium diet, exercises regularly  Home BP Monitoring: Ranging 120s/80s. Pertinent ROS: taking medications as instructed, no TIA's, no chest pain on exertion, no dyspnea on exertion, noting some slight swelling of ankles that comes and goes. Glucose intolerance reveiw:  She has IGT. Diabetic ROS - further diabetic ROS: no polyuria or polydipsia, no chest pain, dyspnea or TIA's, no numbness, tingling or pain in extremities, no unusual visual symptoms. Lab review: orders written for new lab studies as appropriate; see orders. HM:  Mammogram 2/10/2019  Bone density 4/17/2018  FIT test 8/29/2019 which was negative. Eye exam 1/27/2021 by Dr. Navdeep Garber. Patient Active Problem List   Diagnosis Code    Hypovitaminosis D E55.9    Hyperlipidemia E78.5    Asymptomatic PVCs/APC's I49.3    IGT (impaired glucose tolerance) R73.02    Essential hypertension I10    Encounter for medication monitoring Z51.81    Severe obesity (BMI 35.0-39. 9) with comorbidity (HCC) E66.01       Current Outpatient Medications   Medication Sig Dispense Refill    amLODIPine (NORVASC) 5 mg tablet TAKE 2 TABLETS EVERY  Tab 0    potassium chloride SR (KLOR-CON 10) 10 mEq tablet TAKE 1 TABLET BY MOUTH 2 TIMES A WEEK 24 Tab 0    triamterene-hydroCHLOROthiazide (DYAZIDE) 37.5-25 mg per capsule TAKE 1 CAPSULE EVERY OTHER DAY 45 Cap 3    allopurinol (ZYLOPRIM) 100 mg tablet Take 100 mg by mouth daily as needed.  ibuprofen (ADVIL) 200 mg tablet Take 200 mg by mouth every six (6) hours as needed for Pain.  chlorpheniramine-dm (CORICIDIN HBP COUGH AND COLD) 4-30 mg tab Take 1 Tab by mouth daily as needed.  cholecalciferol, vitamin D3, (VITAMIN D3) 2,000 unit tab Take 2,000 Units by mouth daily.  ferrous sulfate 325 mg (65 mg iron) tablet Take 325 mg by mouth two (2) times a week.  aspirin 81 mg tablet Take 1 Tab by mouth daily. Allergies   Allergen Reactions    Triamterene Swelling and Other (comments)     Diazide is ok. ...just it's generic.  Prinzide [Lisinopril-Hydrochlorothiazide] Shortness of Breath     fatigue       Past Medical History:   Diagnosis Date    Gout     Hypertension        Past Surgical History:   Procedure Laterality Date    HX BREAST BIOPSY Right     benign bx age 41's   [facility]IL[facility] CURETTAGE      DC COLONOSCOPY FLX DX W/COLLJ SPEC WHEN PFRMD      \"long time ago\"       Family History   Problem Relation Age of Onset    Anemia Mother     Heart Failure Mother     Heart Failure Father     Dementia Father         alzheimers    No Known Problems Sister     No Known Problems Brother     No Known Problems Brother     No Known Problems Brother     Heart Attack Sister     No Known Problems Brother     No Known Problems Sister        Social History     Tobacco Use    Smoking status: Never Smoker    Smokeless tobacco: Never Used   Substance Use Topics    Alcohol use:  Yes     Alcohol/week: 0.0 standard drinks     Comment: rarely        Lab Results   Component Value Date/Time    WBC 6.6 07/03/2019 09:19 AM    HGB 13.0 07/03/2019 09:19 AM    Hemoglobin (POC) 15.3 12/26/2014 05:11 PM    HCT 40.9 07/03/2019 09:19 AM    Hematocrit (POC) 45 12/26/2014 05:11 PM    PLATELET 445 26/45/3112 09:19 AM    MCV 75 (L) 07/03/2019 09:19 AM     Lab Results   Component Value Date/Time    Cholesterol, total 194 02/26/2020 08:50 AM    HDL Cholesterol 66 02/26/2020 08:50 AM    LDL, calculated 110 (H) 02/26/2020 08:50 AM    Triglyceride 89 02/26/2020 08:50 AM     Lab Results   Component Value Date/Time    TSH 0.876 10/15/2014 09:30 AM    T4, Free 1.14 10/15/2014 09:30 AM      Lab Results   Component Value Date/Time    Sodium 144 02/26/2020 08:50 AM    Potassium 3.8 02/26/2020 08:50 AM    Chloride 100 02/26/2020 08:50 AM    CO2 28 02/26/2020 08:50 AM    Anion gap 4 (L) 12/26/2014 05:04 PM    Glucose 100 (H) 02/26/2020 08:50 AM    BUN 16 02/26/2020 08:50 AM    Creatinine 0.95 02/26/2020 08:50 AM    BUN/Creatinine ratio 17 02/26/2020 08:50 AM    GFR est AA 68 02/26/2020 08:50 AM    GFR est non-AA 59 (L) 02/26/2020 08:50 AM    Calcium 10.4 (H) 02/26/2020 08:50 AM    Bilirubin, total <0.2 02/26/2020 08:50 AM    ALT (SGPT) 21 02/26/2020 08:50 AM    Alk. phosphatase 96 02/26/2020 08:50 AM    Protein, total 7.5 02/26/2020 08:50 AM    Albumin 4.6 02/26/2020 08:50 AM    A-G Ratio 1.6 02/26/2020 08:50 AM      Lab Results   Component Value Date/Time    Hemoglobin A1c 6.2 (H) 06/26/2013 09:48 AM    Hemoglobin A1c (POC) 5.9 02/26/2020 08:54 AM         Review of Systems   Constitutional: Negative for malaise/fatigue. HENT: Negative for congestion. Eyes: Negative for blurred vision. Respiratory: Negative for cough and shortness of breath. Cardiovascular: Negative for chest pain, palpitations and leg swelling. Gastrointestinal: Negative for abdominal pain, constipation and heartburn. Genitourinary: Negative for dysuria, frequency and urgency. Musculoskeletal: Negative for back pain and joint pain. Neurological: Negative for dizziness, tingling and headaches. Endo/Heme/Allergies: Negative for environmental allergies. Psychiatric/Behavioral: Negative for depression. The patient does not have insomnia. Physical Exam  Vitals signs and nursing note reviewed. Constitutional:       Appearance: Normal appearance. She is well-developed.       Comments: /74 (BP 1 Location: Left arm, BP Patient Position: Sitting)   Pulse 66   Temp 96.9 °F (36.1 °C) (Temporal)   Resp 18   Ht 5' 4\" (1.626 m)   Wt 221 lb 3.2 oz (100.3 kg)   LMP 08/15/1994   SpO2 99%   BMI 37.97 kg/m²      HENT:      Right Ear: Tympanic membrane and ear canal normal.      Left Ear: Tympanic membrane and ear canal normal.      Nose: No mucosal edema or rhinorrhea. Neck:      Musculoskeletal: Normal range of motion and neck supple. Thyroid: No thyromegaly. Vascular: No carotid bruit. Cardiovascular:      Rate and Rhythm: Normal rate and regular rhythm. Pulses: Normal pulses. Heart sounds: Normal heart sounds. Pulmonary:      Effort: Pulmonary effort is normal.      Breath sounds: Normal breath sounds. Chest:      Breasts:         Right: Normal.         Left: Normal.   Abdominal:      General: Bowel sounds are normal.      Palpations: Abdomen is soft. There is no mass. Tenderness: There is no abdominal tenderness. Genitourinary:     Rectum: Guaiac result negative. Musculoskeletal: Normal range of motion. General: No swelling. Right lower leg: No edema. Left lower leg: No edema. Lymphadenopathy:      Cervical: No cervical adenopathy. Upper Body:      Right upper body: No supraclavicular, axillary or pectoral adenopathy. Left upper body: No supraclavicular, axillary or pectoral adenopathy. Skin:     General: Skin is warm and dry. Neurological:      General: No focal deficit present. Mental Status: She is alert and oriented to person, place, and time. Psychiatric:         Mood and Affect: Mood normal.         ASSESSMENT and PLAN  Diagnoses and all orders for this visit:    1.  Medicare annual wellness visit, subsequent  -     AMB POC EKG ROUTINE W/ 12 LEADS, INTER & REP  -  NSR, no significant changes from prior tracing of 2016.      2. Essential hypertension  Discussed sodium restriction, high k rich diet, maintaining ideal body weight and regular exercise program such as daily walking 30 min perday 4-5 times per week, as physiologic means to achieve blood pressure control. Medication compliance advised. -     Refill amLODIPine (NORVASC) 5 mg tablet; TAKE 1 TABLETS EVERY DAY  -     Refill triamterene-hydroCHLOROthiazide (DYAZIDE) 37.5-25 mg per capsule; TAKE 1 CAPSULE EVERY DAY    3. Mixed hyperlipidemia  -     LIPID PANEL; Future    4. IGT (impaired glucose tolerance)  -     HEMOGLOBIN A1C WITH EAG; Future    5. Encounter for medication monitoring  -     CBC W/O DIFF; Future  -     METABOLIC PANEL, COMPREHENSIVE; Future    6. Non morbid obesity  I have reviewed/discussed the above normal BMI with the patient. I have recommended the following interventions: dietary management education, guidance, and counseling . 7. Encounter for screening mammogram for breast cancer  -     [facility] MAMMO BI SCREENING INCL CAD; Future    8. Colon cancer screening  -     OCCULT BLOOD IMMUNOASSAY,DIAGNOSTIC    9. Encounter for long-term (current) use of aspirin  -     AMB POC FECAL BLOOD, OCCULT, QL 1 CARD      Follow-up and Dispositions    · Return in about 3 months (around 5/16/2021). current treatment plan is effective, no change in therapy  reviewed diet, exercise and weight control  cardiovascular risk and specific lipid/LDL goals reviewed  reviewed medications and side effects in detail  specific diabetic recommendations: low cholesterol diet, weight control and daily exercise discussed and glycohemoglobin and other lab monitoring discussed     I have discussed diagnosis listed in this note with pt and/or family. I have discussed treatment plans and options and the risk/benefit analysis of those options, including safe use of medications and possible medication side effects. Through the use of shared decision making we have agreed to the above plan.  The patient has received an after-visit summary and questions were answered concerning future plans and follow up. Advise pt of any urgent changes then to proceed to the ER.

## 2021-02-16 NOTE — PROGRESS NOTES
This is a Subsequent Medicare Annual Wellness Exam (AWV) (Performed 12 months after IPPE or effective date of Medicare Part B enrollment)    I have reviewed the patient's medical history in detail and updated the computerized patient record. History     Patient Active Problem List   Diagnosis Code    Hypovitaminosis D E55.9    Hyperlipidemia E78.5    Asymptomatic PVCs/APC's I49.3    IGT (impaired glucose tolerance) R73.02    Essential hypertension I10    Encounter for medication monitoring Z51.81    Severe obesity (BMI 35.0-39. 9) with comorbidity (HCC) E66.01       Current Outpatient Medications   Medication Sig Dispense Refill    amLODIPine (NORVASC) 5 mg tablet TAKE 2 TABLETS EVERY  Tab 0    potassium chloride SR (KLOR-CON 10) 10 mEq tablet TAKE 1 TABLET BY MOUTH 2 TIMES A WEEK 24 Tab 0    triamterene-hydroCHLOROthiazide (DYAZIDE) 37.5-25 mg per capsule TAKE 1 CAPSULE EVERY OTHER DAY 45 Cap 3    allopurinol (ZYLOPRIM) 100 mg tablet Take 100 mg by mouth daily as needed.  ibuprofen (ADVIL) 200 mg tablet Take 200 mg by mouth every six (6) hours as needed for Pain.  chlorpheniramine-dm (CORICIDIN HBP COUGH AND COLD) 4-30 mg tab Take 1 Tab by mouth daily as needed.  cholecalciferol, vitamin D3, (VITAMIN D3) 2,000 unit tab Take 2,000 Units by mouth daily.  ferrous sulfate 325 mg (65 mg iron) tablet Take 325 mg by mouth two (2) times a week.  aspirin 81 mg tablet Take 1 Tab by mouth daily. Allergies   Allergen Reactions    Triamterene Swelling and Other (comments)     Diazide is ok. ...just it's generic.     Prinzide [Lisinopril-Hydrochlorothiazide] Shortness of Breath     fatigue       Past Medical History:   Diagnosis Date    Gout     Hypertension        Past Surgical History:   Procedure Laterality Date    HX BREAST BIOPSY Right     benign bx age 41's   [facility] DILATION AND CURETTAGE      NE COLONOSCOPY FLX DX W/COLLJ SPEC WHEN PFRMD      \"long time ago\" Family History   Problem Relation Age of Onset    Anemia Mother     Heart Failure Mother     Heart Failure Father     Dementia Father         alzheimers    No Known Problems Sister     No Known Problems Brother     No Known Problems Brother     No Known Problems Brother     Heart Attack Sister     No Known Problems Brother     No Known Problems Sister        Social History     Tobacco Use    Smoking status: Never Smoker    Smokeless tobacco: Never Used   Substance Use Topics    Alcohol use: Yes     Alcohol/week: 0.0 standard drinks     Comment: rarely         Depression Risk Factor Screening:     PHQ over the last two weeks    Little interest or pleasure in doing things Not at all   Feeling down, depressed or hopeless Not at all   Total Score PHQ 2 0     Alcohol Risk Factor Screening: You do not drink alcohol or very rarely. Functional Ability and Level of Safety:   Hearing Loss  Hearing is good. Activities of Daily Living  The home contains: has safety equipment smoke detectors, night lights, grab bars in the shower. Patient does total self care    Fall RiskFall Risk Assessment, last 12 mths    Able to walk? Yes   Fall in past 12 months? No     Functional Ability:   Does the patient exhibit a steady gait? yes    How long did it take the patient to get up and walk from a sitting position? seconds    Is the patient self reliant? (ie can do own laundry, meals, household chores)  yes   Does the patient handle his/her own medications? yes   Does the patient handle his/her own money? yes   Is the patients home safe (ie good lighting, handrails on stairs and bath, etc.)? yes   Did you notice or did patient express any hearing difficulties? no   Did you notice or did patient express any vision difficulties?   no        Advance Care Planning:   Patient was offered the opportunity to discuss advance care planning:  yes    Does patient have an Advance Directive:  no   If no, did you provide information on Caring Connections? yes      Abuse Screen  Patient is not abused    Cognitive Screening   Evaluation of Cognitive Function:  Has your family/caregiver stated any concerns about your memory: no      Patient Care Team   Patient Care Team:  Mundo Nguyen MD as PCP - General    Assessment/Plan   Education and counseling provided:  Are appropriate based on today's review and evaluation  End-of-Life planning (with patient's consent)    ASSESSMENT and PLAN    Medicare Annual Wellness  Continue current treatment plan. Continue annual follow up. I have discussed diagnosis listed in this note with pt and/or family. I have discussed treatment plans and options and the risk/benefit analysis of those options, including safe use of medications and possible medication side effects. Through the use of shared decision making we have agreed to the above plan. The patient has received an after-visit summary and questions were answered concerning future plans and follow up. Advise pt of any urgent changes then to proceed to the ER.

## 2021-02-16 NOTE — PATIENT INSTRUCTIONS

## 2021-02-16 NOTE — PROGRESS NOTES
Chief Complaint   Patient presents with   [facility]     Medicare Wellness         Mammogram 2/10/2019    Bone density 4/17/2018    FIT test 8/29/2019 which was negative. Eye exam 1/27/2021 by Dr. Marilee Roberts. 1. Have you been to the ER, urgent care clinic since your last visit? Hospitalized since your last visit? no    2. Have you seen or consulted any other health care providers outside of the [address] [place] [address] since your last visit? Include any pap smears or colon screening.  no

## 2021-02-18 LAB
ALBUMIN SERPL-MCNC: 4 G/DL (ref 3.7–4.7)
ALBUMIN/GLOB SERPL: 1.2 {RATIO} (ref 1.2–2.2)
ALP SERPL-CCNC: 97 IU/L (ref 39–117)
ALT SERPL-CCNC: 36 IU/L (ref 0–32)
AST SERPL-CCNC: 37 IU/L (ref 0–40)
BILIRUB SERPL-MCNC: 0.2 MG/DL (ref 0–1.2)
BUN SERPL-MCNC: 14 MG/DL (ref 8–27)
BUN/CREAT SERPL: 16 (ref 12–28)
CALCIUM SERPL-MCNC: 9.9 MG/DL (ref 8.7–10.3)
CHLORIDE SERPL-SCNC: 98 MMOL/L (ref 96–106)
CHOLEST SERPL-MCNC: 205 MG/DL (ref 100–199)
CO2 SERPL-SCNC: 26 MMOL/L (ref 20–29)
CREAT SERPL-MCNC: 0.89 MG/DL (ref 0.57–1)
ERYTHROCYTE [DISTWIDTH] IN BLOOD BY AUTOMATED COUNT: 16.2 % (ref 11.7–15.4)
EST. AVERAGE GLUCOSE BLD GHB EST-MCNC: 126 MG/DL
GLOBULIN SER CALC-MCNC: 3.4 G/DL (ref 1.5–4.5)
GLUCOSE SERPL-MCNC: 96 MG/DL (ref 65–99)
HBA1C MFR BLD: 6 % (ref 4.8–5.6)
HCT VFR BLD AUTO: 39.6 % (ref 34–46.6)
HDLC SERPL-MCNC: 85 MG/DL
HGB BLD-MCNC: 12.8 G/DL (ref 11.1–15.9)
INTERPRETATION, 910389: NORMAL
LDLC SERPL CALC-MCNC: 107 MG/DL (ref 0–99)
MCH RBC QN AUTO: 23.3 PG (ref 26.6–33)
MCHC RBC AUTO-ENTMCNC: 32.3 G/DL (ref 31.5–35.7)
MCV RBC AUTO: 72 FL (ref 79–97)
PLATELET # BLD AUTO: 318 X10E3/UL (ref 150–450)
POTASSIUM SERPL-SCNC: 4 MMOL/L (ref 3.5–5.2)
PROT SERPL-MCNC: 7.4 G/DL (ref 6–8.5)
RBC # BLD AUTO: 5.5 X10E6/UL (ref 3.77–5.28)
SODIUM SERPL-SCNC: 141 MMOL/L (ref 134–144)
TRIGL SERPL-MCNC: 73 MG/DL (ref 0–149)
VLDLC SERPL CALC-MCNC: 13 MG/DL (ref 5–40)
WBC # BLD AUTO: 4.8 X10E3/UL (ref 3.4–10.8)

## 2021-08-06 DIAGNOSIS — I10 ESSENTIAL HYPERTENSION: ICD-10-CM

## 2021-08-06 RX ORDER — TRIAMTERENE AND HYDROCHLOROTHIAZIDE 37.5; 25 MG/1; MG/1
CAPSULE ORAL
Qty: 45 CAPSULE | Refills: 1 | Status: SHIPPED | OUTPATIENT
Start: 2021-08-06 | End: 2021-08-16 | Stop reason: SDUPTHER

## 2021-08-06 NOTE — TELEPHONE ENCOUNTER
Last visit: 2/16/21  Next visit: not scheduled  Previous refill  3/18/21(45caps+3R)    Requested Prescriptions     Pending Prescriptions Disp Refills    triamterene-hydroCHLOROthiazide (DYAZIDE) 37.5-25 mg per capsule 45 Capsule 3     Sig: TAKE 1 CAPSULE EVERY OTHER DAY

## 2021-08-16 DIAGNOSIS — I10 ESSENTIAL HYPERTENSION: ICD-10-CM

## 2021-08-16 RX ORDER — TRIAMTERENE AND HYDROCHLOROTHIAZIDE 37.5; 25 MG/1; MG/1
1 CAPSULE ORAL DAILY
Qty: 90 CAPSULE | Refills: 3 | Status: SHIPPED | OUTPATIENT
Start: 2021-08-16 | End: 2022-07-29

## 2021-08-16 RX ORDER — TRIAMTERENE AND HYDROCHLOROTHIAZIDE 37.5; 25 MG/1; MG/1
CAPSULE ORAL
Qty: 45 CAPSULE | Refills: 1 | Status: CANCELLED | OUTPATIENT
Start: 2021-08-16

## 2021-08-16 NOTE — TELEPHONE ENCOUNTER
----- Message from [facility] sent at 8/16/2021 10:34 AM EDT -----  Regarding: Vu/Rx  Pt is requesting a Rx for Triamterene with 90 days. She stated Ramon Kendall will not refill it because it says to take every other day and she was told to take it every day and she has no medication. Pts number is [phone] and Ramon Kendall number is [phone].

## 2021-10-15 ENCOUNTER — TRANSCRIBE ORDER (OUTPATIENT)
Dept: SCHEDULING | Age: 76
End: 2021-10-15

## 2021-10-15 DIAGNOSIS — Z12.31 BREAST CANCER SCREENING BY MAMMOGRAM: Primary | ICD-10-CM

## 2021-11-18 ENCOUNTER — HOSPITAL ENCOUNTER (OUTPATIENT)
Dept: MAMMOGRAPHY | Age: 76
Discharge: HOME | End: 2021-11-18
Attending: FAMILY MEDICINE
Payer: MEDICARE

## 2021-11-18 DIAGNOSIS — Z12.31 BREAST CANCER SCREENING BY MAMMOGRAM: ICD-10-CM

## 2021-11-18 PROCEDURE — 77067 SCR MAMMO BI INCL CAD: CPT

## 2021-11-24 ENCOUNTER — CLINICAL SUPPORT (OUTPATIENT)
Dept: FAMILY MEDICINE CLINIC | Age: 76
End: 2021-11-24

## 2021-11-24 DIAGNOSIS — Z23 NEEDS FLU SHOT: Primary | ICD-10-CM

## 2021-11-24 NOTE — PATIENT INSTRUCTIONS
Vaccine Information Statement    Influenza (Flu) Vaccine (Inactivated or Recombinant): What You Need to Know    Many vaccine information statements are available in [place] and other languages. See www.immunize.org/vis. Hojas de información sobre vacunas están disponibles en español y en muchos otros idiomas. Visite www.immunize.org/vis. 1. Why get vaccinated? Influenza vaccine can prevent influenza (flu). Flu is a contagious disease that spreads around the [facility] every year, usually between October and May. Anyone can get the flu, but it is more dangerous for some people. Infants and young children, people 72 years and older, pregnant people, and people with certain health conditions or a weakened immune system are at greatest risk of flu complications. Pneumonia, bronchitis, sinus infections, and ear infections are examples of flu-related complications. If you have a medical condition, such as heart disease, cancer, or diabetes, flu can make it worse. Flu can cause fever and chills, sore throat, muscle aches, fatigue, cough, headache, and runny or stuffy nose. Some people may have vomiting and diarrhea, though this is more common in children than adults. In an average year, thousands of people in the [facility] die from flu, and many more are hospitalized. Flu vaccine prevents millions of illnesses and flu-related visits to the doctor each year. 2. Influenza vaccines     CDC recommends everyone 6 months and older get vaccinated every flu season. Children 6 months through 6years of age may need 2 doses during a single flu season. Everyone else needs only 1 dose each flu season. It takes about 2 weeks for protection to develop after vaccination. There are many flu viruses, and they are always changing. Each year a new flu vaccine is made to protect against the influenza viruses believed to be likely to cause disease in the upcoming flu season.  Even when the vaccine doesnt exactly match these viruses, it may still provide some protection. Influenza vaccine does not cause flu. Influenza vaccine may be given at the same time as other vaccines. 3. Talk with your health care provider    Tell your vaccination provider if the person getting the vaccine:   Has had an allergic reaction after a previous dose of influenza vaccine, or has any severe, life-threatening allergies    Has ever had Guillain-Barré Syndrome (also called GBS)    In some cases, your health care provider may decide to postpone influenza vaccination until a future visit. Influenza vaccine can be administered at any time during pregnancy. People who are or will be pregnant during influenza season should receive inactivated influenza vaccine. People with minor illnesses, such as a cold, may be vaccinated. People who are moderately or severely ill should usually wait until they recover before getting influenza vaccine. Your health care provider can give you more information. 4. Risks of a vaccine reaction     Soreness, redness, and swelling where the shot is given, fever, muscle aches, and headache can happen after influenza vaccination.  There may be a very small increased risk of Guillain-Barré Syndrome (GBS) after inactivated influenza vaccine (the flu shot). Rory Macias children who get the flu shot along with pneumococcal vaccine (PCV13) and/or DTaP vaccine at the same time might be slightly more likely to have a seizure caused by fever. Tell your health care provider if a child who is getting flu vaccine has ever had a seizure. People sometimes faint after medical procedures, including vaccination. Tell your provider if you feel dizzy or have vision changes or ringing in the ears. As with any medicine, there is a very remote chance of a vaccine causing a severe allergic reaction, other serious injury, or death. 5. What if there is a serious problem?     An allergic reaction could occur after the vaccinated person leaves the clinic. If you see signs of a severe allergic reaction (hives, swelling of the face and throat, difficulty breathing, a fast heartbeat, dizziness, or weakness), call 9-1-1 and get the person to the nearest hospital.    For other signs that concern you, call your health care provider. Adverse reactions should be reported to the Vaccine Adverse Event Reporting System (VAERS). Your health care provider will usually file this report, or you can do it yourself. Visit the VAERS website at www.vaers. [facility].gov or call 0-643.480.9736. VAERS is only for reporting reactions, and VAERS staff members do not give medical advice. 6. The National Vaccine Injury Compensation Program    The [facility] Vaccine Injury Compensation Program (VICP) is a federal program that was created to compensate people who may have been injured by certain vaccines. Claims regarding alleged injury or death due to vaccination have a time limit for filing, which may be as short as two years. Visit the VICP website at www.[facility].gov/vaccinecompensation or call 6-530.786.5027 to learn about the program and about filing a claim. 7. How can I learn more?  Ask your health care provider.  Call your local or state health department.  Visit the website of the Food and Drug Administration (FDA) for vaccine package inserts and additional information at www.fda.gov/vaccines-blood-biologics/vaccines.  Contact the Centers for Disease Control and Prevention (CDC):  - Call 0-482.567.9635 (1-800-CDC-INFO) or  - Visit CDCs influenza website at www.cdc.gov/flu. Vaccine Information Statement   Inactivated Influenza Vaccine   8/6/2021  [address] [id number]   Department of Health and Human Services  Centers for Disease Control and Prevention    Office Use Only

## 2022-03-18 PROBLEM — E66.01 SEVERE OBESITY (BMI 35.0-39.9) WITH COMORBIDITY (HCC): Status: ACTIVE | Noted: 2018-05-23

## 2022-03-24 DIAGNOSIS — E87.6 HYPOKALEMIA: ICD-10-CM

## 2022-03-24 RX ORDER — POTASSIUM CHLORIDE 750 MG/1
TABLET, FILM COATED, EXTENDED RELEASE ORAL
Qty: 24 TABLET | Refills: 3 | Status: SHIPPED | OUTPATIENT
Start: 2022-03-24

## 2022-04-18 DIAGNOSIS — I10 ESSENTIAL HYPERTENSION: ICD-10-CM

## 2022-04-19 RX ORDER — AMLODIPINE BESYLATE 5 MG/1
TABLET ORAL
Qty: 60 TABLET | Refills: 0 | Status: SHIPPED | OUTPATIENT
Start: 2022-04-19 | End: 2022-05-27 | Stop reason: DRUGHIGH

## 2022-04-25 ENCOUNTER — TELEPHONE (OUTPATIENT)
Dept: FAMILY MEDICINE CLINIC | Age: 77
End: 2022-04-25

## 2022-04-25 NOTE — TELEPHONE ENCOUNTER
Called patient LM prior to any further refills an office visit will be needed. Appt has been scheduled for 5/27/2022 at 10:20am. If any questions or unable to make appt please call back to reschedule.

## 2022-05-27 ENCOUNTER — OFFICE VISIT (OUTPATIENT)
Dept: FAMILY MEDICINE CLINIC | Age: 77
End: 2022-05-27
Payer: MEDICARE

## 2022-05-27 VITALS
TEMPERATURE: 96.9 F | WEIGHT: 214 LBS | DIASTOLIC BLOOD PRESSURE: 73 MMHG | BODY MASS INDEX: 36.54 KG/M2 | SYSTOLIC BLOOD PRESSURE: 124 MMHG | OXYGEN SATURATION: 96 % | HEIGHT: 64 IN | RESPIRATION RATE: 16 BRPM | HEART RATE: 60 BPM

## 2022-05-27 DIAGNOSIS — I10 ESSENTIAL HYPERTENSION: ICD-10-CM

## 2022-05-27 DIAGNOSIS — Z51.81 ENCOUNTER FOR MEDICATION MONITORING: ICD-10-CM

## 2022-05-27 DIAGNOSIS — Z00.00 MEDICARE ANNUAL WELLNESS VISIT, SUBSEQUENT: Primary | ICD-10-CM

## 2022-05-27 DIAGNOSIS — E78.2 MIXED HYPERLIPIDEMIA: ICD-10-CM

## 2022-05-27 DIAGNOSIS — R73.02 IGT (IMPAIRED GLUCOSE TOLERANCE): ICD-10-CM

## 2022-05-27 DIAGNOSIS — E66.9 NON MORBID OBESITY: ICD-10-CM

## 2022-05-27 LAB
GLUCOSE POC: 91 MG/DL
HBA1C MFR BLD HPLC: 5.6 %

## 2022-05-27 PROCEDURE — G8427 DOCREV CUR MEDS BY ELIG CLIN: HCPCS | Performed by: FAMILY MEDICINE

## 2022-05-27 PROCEDURE — 83036 HEMOGLOBIN GLYCOSYLATED A1C: CPT | Performed by: FAMILY MEDICINE

## 2022-05-27 PROCEDURE — G8752 SYS BP LESS 140: HCPCS | Performed by: FAMILY MEDICINE

## 2022-05-27 PROCEDURE — 1090F PRES/ABSN URINE INCON ASSESS: CPT | Performed by: FAMILY MEDICINE

## 2022-05-27 PROCEDURE — G8536 NO DOC ELDER MAL SCRN: HCPCS | Performed by: FAMILY MEDICINE

## 2022-05-27 PROCEDURE — 1101F PT FALLS ASSESS-DOCD LE1/YR: CPT | Performed by: FAMILY MEDICINE

## 2022-05-27 PROCEDURE — G0439 PPPS, SUBSEQ VISIT: HCPCS | Performed by: FAMILY MEDICINE

## 2022-05-27 PROCEDURE — G8510 SCR DEP NEG, NO PLAN REQD: HCPCS | Performed by: FAMILY MEDICINE

## 2022-05-27 PROCEDURE — 99213 OFFICE O/P EST LOW 20 MIN: CPT | Performed by: FAMILY MEDICINE

## 2022-05-27 PROCEDURE — G8754 DIAS BP LESS 90: HCPCS | Performed by: FAMILY MEDICINE

## 2022-05-27 PROCEDURE — 1123F ACP DISCUSS/DSCN MKR DOCD: CPT | Performed by: FAMILY MEDICINE

## 2022-05-27 PROCEDURE — 82947 ASSAY GLUCOSE BLOOD QUANT: CPT | Performed by: FAMILY MEDICINE

## 2022-05-27 PROCEDURE — G8399 PT W/DXA RESULTS DOCUMENT: HCPCS | Performed by: FAMILY MEDICINE

## 2022-05-27 PROCEDURE — G8417 CALC BMI ABV UP PARAM F/U: HCPCS | Performed by: FAMILY MEDICINE

## 2022-05-27 RX ORDER — AMLODIPINE BESYLATE 5 MG/1
5 TABLET ORAL DAILY
COMMUNITY
End: 2022-07-19 | Stop reason: SDUPTHER

## 2022-05-27 NOTE — PROGRESS NOTES
HISTORY OF PRESENT ILLNESS  Louisa Faria is a 67 y.o. female. HPI   Follow up on chronic medical problems. Overall feeling well. Doing the precautionary measures at home to reduce risks of exposure COVID19. Also wearing mask when she is going out. No known sick contacts or known exposure to Katrina Eckert. Cardiovascular Review:  The patient has hypertension and hyperlipidemia. Tolerating Norvasc very well. No chest pain and no SOB. Diet and Lifestyle: generally follows a low fat low cholesterol diet, generally follows a low sodium diet, exercises regularly  Home BP Monitoring: Ranging 120s/80s. Pertinent ROS: taking medications as instructed, no TIA's, no chest pain on exertion, no dyspnea on exertion, noting some slight swelling of ankles that comes and goes. Glucose intolerance reveiw:  She has IGT. Diabetic ROS - further diabetic ROS: no polyuria or polydipsia, no chest pain, dyspnea or TIA's, no numbness, tingling or pain in extremities, no unusual visual symptoms. Lab review: orders written for new lab studies as appropriate; see orders. HM:  Mammogram 11/18/2021  Bone density 4/17/2018    Patient Active Problem List   Diagnosis Code    Hypovitaminosis D E55.9    Hyperlipidemia E78.5    Asymptomatic PVCs/APC's I49.3    IGT (impaired glucose tolerance) R73.02    Essential hypertension I10    Encounter for medication monitoring Z51.81    Severe obesity (BMI 35.0-39. 9) with comorbidity (HCC) E66.01       Current Outpatient Medications   Medication Sig Dispense Refill    amLODIPine (NORVASC) 5 mg tablet TAKE 2 TABLETS EVERY DAY. Needs office visit. 60 Tablet 0    potassium chloride SR (KLOR-CON 10) 10 mEq tablet TAKE 1 TABLET BY MOUTH 2 TIMES A WEEK 24 Tablet 3    triamterene-hydroCHLOROthiazide (DYAZIDE) 37.5-25 mg per capsule Take 1 Capsule by mouth daily. 90 Capsule 3    APPLE CIDER VINEGAR PO Take 1 Tab by mouth two (2) times a week.       allopurinoL (ZYLOPRIM) 100 mg tablet Take 1 Tab by mouth daily as needed.  chlorpheniramine-dm (CORICIDIN HBP COUGH AND COLD) 4-30 mg tab Take 1 Tab by mouth daily as needed.  cholecalciferol, vitamin D3, (VITAMIN D3) 2,000 unit tab Take 2,000 Units by mouth daily.  ferrous sulfate 325 mg (65 mg iron) tablet Take 325 mg by mouth two (2) times a week.  aspirin 81 mg tablet Take 1 Tab by mouth daily. Allergies   Allergen Reactions    Triamterene Swelling and Other (comments)     Diazide is ok. ...just it's generic.  Prinzide [Lisinopril-Hydrochlorothiazide] Shortness of Breath     fatigue         Past Medical History:   Diagnosis Date    Gout     Hypertension          Past Surgical History:   Procedure Laterality Date    HX BREAST BIOPSY Right     benign bx age 41's   [de-identified] DILATION AND CURETTAGE      [place] COLONOSCOPY FLX DX W/COLLJ SPEC WHEN PFRMD      \"long time ago\"         Family History   Problem Relation Age of Onset    Anemia Mother     Heart Failure Mother     Heart Failure Father     Dementia Father         alzheimers    Cataract Sister     No Known Problems Brother     No Known Problems Brother     No Known Problems Brother     Heart Attack Sister     No Known Problems Brother     No Known Problems Sister        Social History     Tobacco Use    Smoking status: Never Smoker    Smokeless tobacco: Never Used   Substance Use Topics    Alcohol use:  Yes     Alcohol/week: 0.0 standard drinks     Comment: rarely        Lab Results   Component Value Date/Time    WBC 4.8 02/17/2021 09:18 AM    HGB 12.8 02/17/2021 09:18 AM    Hemoglobin (POC) 15.3 12/26/2014 05:11 PM    HCT 39.6 02/17/2021 09:18 AM    Hematocrit (POC) 45 12/26/2014 05:11 PM    PLATELET 374 64/94/3928 09:18 AM    MCV 72 (L) 02/17/2021 09:18 AM     Lab Results   Component Value Date/Time    Cholesterol, total 205 (H) 02/17/2021 09:18 AM    HDL Cholesterol 85 02/17/2021 09:18 AM    LDL, calculated 107 (H) 02/17/2021 09:18 AM    LDL, calculated 110 (H) 02/26/2020 08:50 AM    Triglyceride 73 02/17/2021 09:18 AM     Lab Results   Component Value Date/Time    TSH 0.876 10/15/2014 09:30 AM    T4, Free 1.14 10/15/2014 09:30 AM      Lab Results   Component Value Date/Time    Sodium 141 02/17/2021 09:18 AM    Potassium 4.0 02/17/2021 09:18 AM    Chloride 98 02/17/2021 09:18 AM    CO2 26 02/17/2021 09:18 AM    Anion gap 4 (L) 12/26/2014 05:04 PM    Glucose 96 02/17/2021 09:18 AM    BUN 14 02/17/2021 09:18 AM    Creatinine 0.89 02/17/2021 09:18 AM    BUN/Creatinine ratio 16 02/17/2021 09:18 AM    GFR est AA 73 02/17/2021 09:18 AM    GFR est non-AA 63 02/17/2021 09:18 AM    Calcium 9.9 02/17/2021 09:18 AM    Bilirubin, total 0.2 02/17/2021 09:18 AM    ALT (SGPT) 36 (H) 02/17/2021 09:18 AM    Alk. phosphatase 97 02/17/2021 09:18 AM    Protein, total 7.4 02/17/2021 09:18 AM    Albumin 4.0 02/17/2021 09:18 AM    A-G Ratio 1.2 02/17/2021 09:18 AM      Lab Results   Component Value Date/Time    Hemoglobin A1c 6.0 (H) 02/17/2021 09:18 AM    Hemoglobin A1c (POC) 5.9 02/26/2020 08:54 AM         Review of Systems   Constitutional: Negative for malaise/fatigue. HENT: Negative for congestion. Eyes: Negative for blurred vision. Respiratory: Negative for cough and shortness of breath. Cardiovascular: Negative for chest pain, palpitations and leg swelling. Gastrointestinal: Negative for abdominal pain, constipation and heartburn. Genitourinary: Negative for dysuria, frequency and urgency. Musculoskeletal: Negative for back pain and joint pain. Neurological: Negative for dizziness, tingling and headaches. Endo/Heme/Allergies: Negative for environmental allergies. Psychiatric/Behavioral: Negative for depression. The patient does not have insomnia. Physical Exam  Vitals and nursing note reviewed. Constitutional:       Appearance: Normal appearance. She is well-developed.       Comments: /73 (BP 1 Location: Left arm, BP Patient Position: Sitting) Pulse 60   Temp 96.9 °F (36.1 °C) (Oral)   Resp 16   Ht 5' 4\" (1.626 m)   Wt 214 lb (97.1 kg)   LMP 08/15/1994   SpO2 96%   BMI 36.73 kg/m²    HENT:      Right Ear: Tympanic membrane and ear canal normal.      Left Ear: Tympanic membrane and ear canal normal.   Neck:      Thyroid: No thyromegaly. Cardiovascular:      Rate and Rhythm: Normal rate and regular rhythm. Heart sounds: Normal heart sounds. Pulmonary:      Effort: Pulmonary effort is normal.      Breath sounds: Normal breath sounds. Abdominal:      General: Bowel sounds are normal.      Palpations: Abdomen is soft. There is no mass. Tenderness: There is no abdominal tenderness. Musculoskeletal:         General: Normal range of motion. Cervical back: Normal range of motion and neck supple. Right lower leg: No edema. Left lower leg: No edema. Lymphadenopathy:      Cervical: No cervical adenopathy. Skin:     General: Skin is warm and dry. Neurological:      General: No focal deficit present. Mental Status: She is alert and oriented to person, place, and time. Psychiatric:         Mood and Affect: Mood normal.         ASSESSMENT and PLAN  Diagnoses and all orders for this visit:    1. Medicare annual wellness visit, subsequent    2. Essential hypertension  Stable at goal.      3. Mixed hyperlipidemia  Continue to monitor. Work on diet and exercise.  -     LIPID PANEL; Future    4. IGT (impaired glucose tolerance)  Continue to monitor. Work on diet and exercise. -     AMB POC HEMOGLOBIN A1C  -     AMB POC GLUCOSE, QUANTITATIVE, BLOOD    5. Encounter for medication monitoring  -     CBC W/O DIFF; Future  -     METABOLIC PANEL, COMPREHENSIVE; Future    6. Non morbid obesity  I have reviewed/discussed the above normal BMI with the patient. I have recommended the following interventions: dietary management education, guidance, and counseling .          Follow-up and Dispositions    · Return in about 6 months (around 11/27/2022). reviewed diet, exercise and weight control  cardiovascular risk and specific lipid/LDL goals reviewed  reviewed medications and side effects in detail  specific diabetic recommendations: low cholesterol diet, weight control and daily exercise discussed and glycohemoglobin and other lab monitoring discussed      I have discussed diagnosis listed in this note with pt and/or family. I have discussed treatment plans and options and the risk/benefit analysis of those options, including safe use of medications and possible medication side effects. Through the use of shared decision making we have agreed to the above plan. The patient has received an after-visit summary and questions were answered concerning future plans and follow up. Advise pt of any urgent changes then to proceed to the ER.

## 2022-05-27 NOTE — PROGRESS NOTES
This is a Subsequent Medicare Annual Wellness Exam (AWV) (Performed 12 months after IPPE or effective date of Medicare Part B enrollment)    I have reviewed the patient's medical history in detail and updated the computerized patient record. History     Patient Active Problem List   Diagnosis Code    Hypovitaminosis D E55.9    Hyperlipidemia E78.5    Asymptomatic PVCs/APC's I49.3    IGT (impaired glucose tolerance) R73.02    Essential hypertension I10    Encounter for medication monitoring Z51.81    Severe obesity (BMI 35.0-39. 9) with comorbidity (HCC) E66.01       Current Outpatient Medications   Medication Sig Dispense Refill    amLODIPine (NORVASC) 5 mg tablet TAKE 2 TABLETS EVERY DAY. Needs office visit. 60 Tablet 0    potassium chloride SR (KLOR-CON 10) 10 mEq tablet TAKE 1 TABLET BY MOUTH 2 TIMES A WEEK 24 Tablet 3    triamterene-hydroCHLOROthiazide (DYAZIDE) 37.5-25 mg per capsule Take 1 Capsule by mouth daily. 90 Capsule 3    APPLE CIDER VINEGAR PO Take 1 Tab by mouth two (2) times a week.  allopurinoL (ZYLOPRIM) 100 mg tablet Take 1 Tab by mouth daily as needed.  chlorpheniramine-dm (CORICIDIN HBP COUGH AND COLD) 4-30 mg tab Take 1 Tab by mouth daily as needed.  cholecalciferol, vitamin D3, (VITAMIN D3) 2,000 unit tab Take 2,000 Units by mouth daily.  ferrous sulfate 325 mg (65 mg iron) tablet Take 325 mg by mouth two (2) times a week.  aspirin 81 mg tablet Take 1 Tab by mouth daily. Allergies   Allergen Reactions    Triamterene Swelling and Other (comments)     Diazide is ok. ...just it's generic.     Prinzide [Lisinopril-Hydrochlorothiazide] Shortness of Breath     fatigue       Past Medical History:   Diagnosis Date    Gout     Hypertension        Past Surgical History:   Procedure Laterality Date    HX BREAST BIOPSY Right     benign bx age 41's   Carolyn Toscano DILATION AND CURETTAGE      [place] COLONOSCOPY FLX DX W/COLLJ SPEC WHEN PFRMD      \"long time ago\" Family History   Problem Relation Age of Onset    Anemia Mother     Heart Failure Mother     Heart Failure Father     Dementia Father         alzheimers    Cataract Sister     No Known Problems Brother     No Known Problems Brother     No Known Problems Brother     Heart Attack Sister     No Known Problems Brother     No Known Problems Sister        Social History     Tobacco Use    Smoking status: Never Smoker    Smokeless tobacco: Never Used   Substance Use Topics    Alcohol use: Yes     Alcohol/week: 0.0 standard drinks     Comment: rarely         Depression Risk Factor Screening:     PHQ over the last two weeks    Little interest or pleasure in doing things Not at all   Feeling down, depressed or hopeless Not at all   Total Score PHQ 2 0     Alcohol Risk Factor Screening: You do drink alcohol once a week or less. Functional Ability and Level of Safety:   Hearing Loss  Hearing is good. Activities of Daily Living  The home contains: discussed safety equipment. Patient does total self care    Fall RiskFall Risk Assessment, last 12 mths    Able to walk? Yes   Fall in past 12 months? No     Functional Ability:   Does the patient exhibit a steady gait? yes with cane    How long did it take the patient to get up and walk from a sitting position? Few seconds    Is the patient self reliant? (ie can do own laundry, meals, household chores)  yes   Does the patient handle his/her own medications? yes   Does the patient handle his/her own money? yes   Is the patients home safe (ie good lighting, handrails on stairs and bath, etc.)? yes   Did you notice or did patient express any hearing difficulties? no   Did you notice or did patient express any vision difficulties? no        Advance Care Planning:   Patient was offered the opportunity to discuss advance care planning:  yes    Does patient have an Advance Directive:  no   If no, did you provide information on [facility]?   yes Abuse Screen  Patient is not abused    Cognitive Screening   Evaluation of Cognitive Function:  Has your family/caregiver stated any concerns about your memory: no      Patient Care Team   Patient Care Team:  Polina Olmedo MD as PCP - General    Assessment/Plan   Education and counseling provided:  Are appropriate based on today's review and evaluation  End-of-Life planning (with patient's consent)    ASSESSMENT and PLAN    Medicare Annual Wellness  Continue current treatment plan. Continue annual follow up. I have discussed diagnosis listed in this note with pt and/or family. I have discussed treatment plans and options and the risk/benefit analysis of those options, including safe use of medications and possible medication side effects. Through the use of shared decision making we have agreed to the above plan. The patient has received an after-visit summary and questions were answered concerning future plans and follow up. Advise pt of any urgent changes then to proceed to the ER.

## 2022-05-27 NOTE — PROGRESS NOTES
Chief Complaint   Patient presents with    Hypertension     follow up    Cholesterol Problem     follow up           Mammogram 11/18/2021    Bone density 4/17/2018    FIT test 8/29/2019 which was negative. Eye exam 1/27/2021 by Dr. Yenny Barber. 1. Have you been to the ER, urgent care clinic since your last visit? Hospitalized since your last visit? no    2. Have you seen or consulted any other health care providers outside of the [address] [place] [address] since your last visit? Include any pap smears or colon screening.  no

## 2022-05-28 LAB
ALBUMIN SERPL-MCNC: 3.9 G/DL (ref 3.5–5)
ALBUMIN/GLOB SERPL: 1 {RATIO} (ref 1.1–2.2)
ALP SERPL-CCNC: 76 U/L (ref 45–117)
ALT SERPL-CCNC: 34 U/L (ref 12–78)
ANION GAP SERPL CALC-SCNC: 5 MMOL/L (ref 5–15)
AST SERPL-CCNC: 35 U/L (ref 15–37)
BILIRUB SERPL-MCNC: 0.4 MG/DL (ref 0.2–1)
BUN SERPL-MCNC: 13 MG/DL (ref 6–20)
BUN/CREAT SERPL: 14 (ref 12–20)
CALCIUM SERPL-MCNC: 7.9 MG/DL (ref 8.5–10.1)
CHLORIDE SERPL-SCNC: 101 MMOL/L (ref 97–108)
CHOLEST SERPL-MCNC: 160 MG/DL
CO2 SERPL-SCNC: 33 MMOL/L (ref 21–32)
CREAT SERPL-MCNC: 0.93 MG/DL (ref 0.55–1.02)
ERYTHROCYTE [DISTWIDTH] IN BLOOD BY AUTOMATED COUNT: 17.2 % (ref 11.5–14.5)
GLOBULIN SER CALC-MCNC: 3.8 G/DL (ref 2–4)
GLUCOSE SERPL-MCNC: 84 MG/DL (ref 65–100)
HCT VFR BLD AUTO: 43.3 % (ref 35–47)
HDLC SERPL-MCNC: 67 MG/DL
HDLC SERPL: 2.4 {RATIO} (ref 0–5)
HGB BLD-MCNC: 13.1 G/DL (ref 11.5–16)
LDLC SERPL CALC-MCNC: 76.4 MG/DL (ref 0–100)
MCH RBC QN AUTO: 23.9 PG (ref 26–34)
MCHC RBC AUTO-ENTMCNC: 30.3 G/DL (ref 30–36.5)
MCV RBC AUTO: 79.2 FL (ref 80–99)
NRBC # BLD: 0 K/UL (ref 0–0.01)
NRBC BLD-RTO: 0 PER 100 WBC
PLATELET # BLD AUTO: 299 K/UL (ref 150–400)
PMV BLD AUTO: 11.2 FL (ref 8.9–12.9)
POTASSIUM SERPL-SCNC: 4.4 MMOL/L (ref 3.5–5.1)
PROT SERPL-MCNC: 7.7 G/DL (ref 6.4–8.2)
RBC # BLD AUTO: 5.47 M/UL (ref 3.8–5.2)
SODIUM SERPL-SCNC: 139 MMOL/L (ref 136–145)
TRIGL SERPL-MCNC: 83 MG/DL (ref ?–150)
VLDLC SERPL CALC-MCNC: 16.6 MG/DL
WBC # BLD AUTO: 6.9 K/UL (ref 3.6–11)

## 2022-07-19 RX ORDER — AMLODIPINE BESYLATE 5 MG/1
5 TABLET ORAL DAILY
Qty: 90 TABLET | Refills: 2 | Status: SHIPPED | OUTPATIENT
Start: 2022-07-19

## 2022-07-29 DIAGNOSIS — I10 ESSENTIAL HYPERTENSION: ICD-10-CM

## 2022-07-29 RX ORDER — TRIAMTERENE AND HYDROCHLOROTHIAZIDE 37.5; 25 MG/1; MG/1
CAPSULE ORAL
Qty: 90 CAPSULE | Refills: 3 | Status: SHIPPED | OUTPATIENT
Start: 2022-07-29

## 2022-08-22 RX ORDER — ALLOPURINOL 100 MG/1
100 TABLET ORAL DAILY
Qty: 30 TABLET | Refills: 5 | Status: SHIPPED | OUTPATIENT
Start: 2022-08-22

## 2022-09-19 ENCOUNTER — OFFICE VISIT (OUTPATIENT)
Dept: FAMILY MEDICINE CLINIC | Age: 77
End: 2022-09-19
Payer: MEDICARE

## 2022-09-19 VITALS
BODY MASS INDEX: 36.19 KG/M2 | DIASTOLIC BLOOD PRESSURE: 73 MMHG | HEART RATE: 64 BPM | SYSTOLIC BLOOD PRESSURE: 134 MMHG | OXYGEN SATURATION: 98 % | TEMPERATURE: 98 F | HEIGHT: 64 IN | RESPIRATION RATE: 17 BRPM | WEIGHT: 212 LBS

## 2022-09-19 DIAGNOSIS — L24.9 IRRITANT DERMATITIS: Primary | ICD-10-CM

## 2022-09-19 PROBLEM — N18.30 CHRONIC RENAL DISEASE, STAGE III (HCC): Status: ACTIVE | Noted: 2022-09-19

## 2022-09-19 PROCEDURE — G8427 DOCREV CUR MEDS BY ELIG CLIN: HCPCS | Performed by: FAMILY MEDICINE

## 2022-09-19 PROCEDURE — 99212 OFFICE O/P EST SF 10 MIN: CPT | Performed by: FAMILY MEDICINE

## 2022-09-19 PROCEDURE — G8510 SCR DEP NEG, NO PLAN REQD: HCPCS | Performed by: FAMILY MEDICINE

## 2022-09-19 PROCEDURE — G8754 DIAS BP LESS 90: HCPCS | Performed by: FAMILY MEDICINE

## 2022-09-19 PROCEDURE — 1090F PRES/ABSN URINE INCON ASSESS: CPT | Performed by: FAMILY MEDICINE

## 2022-09-19 PROCEDURE — G8536 NO DOC ELDER MAL SCRN: HCPCS | Performed by: FAMILY MEDICINE

## 2022-09-19 PROCEDURE — G8417 CALC BMI ABV UP PARAM F/U: HCPCS | Performed by: FAMILY MEDICINE

## 2022-09-19 PROCEDURE — G8752 SYS BP LESS 140: HCPCS | Performed by: FAMILY MEDICINE

## 2022-09-19 PROCEDURE — 1123F ACP DISCUSS/DSCN MKR DOCD: CPT | Performed by: FAMILY MEDICINE

## 2022-09-19 PROCEDURE — 1101F PT FALLS ASSESS-DOCD LE1/YR: CPT | Performed by: FAMILY MEDICINE

## 2022-09-19 PROCEDURE — G8399 PT W/DXA RESULTS DOCUMENT: HCPCS | Performed by: FAMILY MEDICINE

## 2022-09-19 RX ORDER — CETIRIZINE HCL 10 MG
10 TABLET ORAL
Qty: 30 TABLET | Refills: 1
Start: 2022-09-19 | End: 2022-11-04 | Stop reason: ALTCHOICE

## 2022-09-19 RX ORDER — PREDNISONE 10 MG/1
10 TABLET ORAL 2 TIMES DAILY
Qty: 12 TABLET | Refills: 0 | Status: SHIPPED | OUTPATIENT
Start: 2022-09-19 | End: 2022-09-25

## 2022-09-19 RX ORDER — TRIAMCINOLONE ACETONIDE 1 MG/G
CREAM TOPICAL
Qty: 30 G | Refills: 0 | Status: SHIPPED | OUTPATIENT
Start: 2022-09-19

## 2022-09-19 NOTE — PROGRESS NOTES
Identified pt with two pt identifiers(name and ). Reviewed record in preparation for visit and have obtained necessary documentation. All patient medications has been reviewed. Chief Complaint   Patient presents with    Rash     Patient stated she has rash on bilateral arms        3 most recent PHQ Screens 2022   Little interest or pleasure in doing things Not at all   Feeling down, depressed, irritable, or hopeless Several days   Total Score PHQ 2 1     Abuse Screening Questionnaire 2022   Do you ever feel afraid of your partner? N   Are you in a relationship with someone who physically or mentally threatens you? N   Is it safe for you to go home? Y       Health Maintenance Due   Topic    COVID-19 Vaccine (4 - Booster for Keyes Peter series)    Flu Vaccine (1)     Health Maintenance Review: Patient reminded of \"due or due soon\" health maintenance. I have asked the patient to contact his/her primary care provider (PCP) for follow-up on his/her health maintenance. Vitals:    22 1218   BP: 134/73   Pulse: 64   Resp: 17   SpO2: 98%   Weight: 212 lb (96.2 kg)   Height: 5' 4\" (1.626 m)   LMP: 08/15/1994       Wt Readings from Last 3 Encounters:   22 212 lb (96.2 kg)   22 214 lb (97.1 kg)   21 221 lb 3.2 oz (100.3 kg)     Temp Readings from Last 3 Encounters:   22 96.9 °F (36.1 °C) (Oral)   21 96.9 °F (36.1 °C) (Temporal)   20 (!) 96 °F (35.6 °C) (Temporal)     BP Readings from Last 3 Encounters:   22 134/73   22 124/73   21 120/74     Pulse Readings from Last 3 Encounters:   22 64   22 60   21 66       1. \"Have you been to the ER, urgent care clinic since your last visit? Hospitalized since your last visit? \" No    2. \"Have you seen or consulted any other health care providers outside of the [address] [place] [address] since your last visit? \" No

## 2022-09-19 NOTE — PROGRESS NOTES
HISTORY OF PRESENT ILLNESS  Tk Garcia is a 67 y.o. female. HPI  Rash and itching on arms, face and under breasts over the past week. She is dry and feeling scaly \"like an older lady skin\". She just discovered that she was using body wash as a lotion by mistake and was not rinsing it off and therefore she thinks this is what has caused and irritated the rash. Patient Active Problem List   Diagnosis Code    Hypovitaminosis D E55.9    Hyperlipidemia E78.5    Asymptomatic PVCs/APC's I49.3    IGT (impaired glucose tolerance) R73.02    Essential hypertension I10    Encounter for medication monitoring Z51.81    Severe obesity (BMI 35.0-39. 9) with comorbidity (HCC) E66.01       Current Outpatient Medications   Medication Sig Dispense Refill    predniSONE (DELTASONE) 10 mg tablet Take 10 mg by mouth two (2) times a day for 6 days. 12 Tablet 0    triamcinolone acetonide (KENALOG) 0.1 % topical cream Apply  to affected area two (2) times daily as needed for Skin Irritation. use thin layer 30 g 0    cetirizine (ZYRTEC) 10 mg tablet Take 1 Tablet by mouth daily as needed for Itching (rash). 30 Tablet 1    allopurinoL (ZYLOPRIM) 100 mg tablet Take 1 Tablet by mouth daily. 30 Tablet 5    triamterene-hydroCHLOROthiazide (DYAZIDE) 37.5-25 mg per capsule TAKE 1 CAPSULE EVERY DAY 90 Capsule 3    amLODIPine (Norvasc) 5 mg tablet Take 1 Tablet by mouth daily. Take 5 mg by mouth daily. 90 Tablet 2    potassium chloride SR (KLOR-CON 10) 10 mEq tablet TAKE 1 TABLET BY MOUTH 2 TIMES A WEEK 24 Tablet 3    APPLE CIDER VINEGAR PO Take 1 Tab by mouth two (2) times a week. cholecalciferol, vitamin D3, 50 mcg (2,000 unit) tab Take 2,000 Units by mouth daily. ferrous sulfate 325 mg (65 mg iron) tablet Take 325 mg by mouth two (2) times a week. Allergies   Allergen Reactions    Triamterene Swelling and Other (comments)     Diazide is ok. ...just it's generic.     Prinzide [Lisinopril-Hydrochlorothiazide] Shortness of Breath     fatigue         Past Medical History:   Diagnosis Date    Gout     Hypertension          Past Surgical History:   Procedure Laterality Date    HX BREAST BIOPSY Right     benign bx age 41's    HX DILATION AND CURETTAGE      OR COLONOSCOPY FLX DX W/COLLJ SPEC WHEN PFRMD      \"long time ago\"         Family History   Problem Relation Age of Onset    Anemia Mother     Heart Failure Mother     Heart Failure Father     Dementia Father         alzheimers    Cataract Sister     No Known Problems Brother     No Known Problems Brother     No Known Problems Brother     Heart Attack Sister     No Known Problems Brother     No Known Problems Sister        Social History     Tobacco Use    Smoking status: Never    Smokeless tobacco: Never   Substance Use Topics    Alcohol use: Yes     Alcohol/week: 0.0 standard drinks     Comment: rarely          ROS    Physical Exam  Vitals and nursing note reviewed. Constitutional:       Appearance: Normal appearance. She is well-developed. Comments: /73 (BP 1 Location: Left upper arm, BP Patient Position: Sitting, BP Cuff Size: Small adult)   Pulse 64   Temp 98 °F (36.7 °C)   Resp 17   Ht 5' 4\" (1.626 m)   Wt 212 lb (96.2 kg)   LMP 08/15/1994   SpO2 98%   BMI 36.39 kg/m²    Neck:      Thyroid: No thyromegaly. Cardiovascular:      Rate and Rhythm: Normal rate and regular rhythm. Heart sounds: Normal heart sounds. No gallop. Pulmonary:      Effort: Pulmonary effort is normal.      Breath sounds: Normal breath sounds. Musculoskeletal:      Right lower leg: No edema. Left lower leg: No edema. Skin:     Findings: Rash present. Comments: Rash on the hand and forearm and antecubital fossa. Has irritant type rash under both breasts and upper abd. Neurological:      Mental Status: She is alert. ASSESSMENT and PLAN  Diagnoses and all orders for this visit:    1. Irritant dermatitis  -     predniSONE (DELTASONE) 10 mg tablet;  Take 10 mg by mouth two (2) times a day for 6 days. -     triamcinolone acetonide (KENALOG) 0.1 % topical cream; Apply  to affected area two (2) times daily as needed for Skin Irritation. use thin layer  -     cetirizine (ZYRTEC) 10 mg tablet; Take 1 Tablet by mouth daily as needed for Itching (rash). Follow-up and Dispositions    Return in about 2 weeks (around 10/3/2022). reviewed medications and side effects in detail    I have discussed diagnosis listed in this note with pt and/or family. I have discussed treatment plans and options and the risk/benefit analysis of those options, including safe use of medications and possible medication side effects. Through the use of shared decision making we have agreed to the above plan. The patient has received an after-visit summary and questions were answered concerning future plans and follow up. Advise pt of any urgent changes then to proceed to the ER.

## 2022-09-28 ENCOUNTER — TELEPHONE (OUTPATIENT)
Dept: FAMILY MEDICINE CLINIC | Age: 77
End: 2022-09-28

## 2022-09-28 NOTE — TELEPHONE ENCOUNTER
Beatriz frey [id number]    Needs to knwo what to do for her skin  Asap--it is really bad and needs relief  Does she need a referral to a dermatologist   Plz call and leave a detailed message. ...for what she should do   [phone] thank you

## 2022-09-29 NOTE — TELEPHONE ENCOUNTER
Returned call to pt,  requesting name of dermatologist,  stated her skin is getting worse,  peeling , rash worse,  contact information given for [facility] and [facility] dermat[facility]

## 2022-11-04 ENCOUNTER — OFFICE VISIT (OUTPATIENT)
Dept: FAMILY MEDICINE CLINIC | Age: 77
End: 2022-11-04
Payer: MEDICARE

## 2022-11-04 VITALS
RESPIRATION RATE: 14 BRPM | HEIGHT: 64 IN | OXYGEN SATURATION: 95 % | BODY MASS INDEX: 36.26 KG/M2 | DIASTOLIC BLOOD PRESSURE: 72 MMHG | WEIGHT: 212.4 LBS | TEMPERATURE: 99.3 F | HEART RATE: 90 BPM | SYSTOLIC BLOOD PRESSURE: 128 MMHG
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
## 2022-11-04 DIAGNOSIS — T14.8XXA TRAUMATIC ECCHYMOSIS OF MULTIPLE SITES: ICD-10-CM

## 2022-11-04 DIAGNOSIS — T14.8XXA TRAUMATIC MYALGIA: ICD-10-CM

## 2022-11-04 DIAGNOSIS — Z23 NEEDS FLU SHOT: ICD-10-CM

## 2022-11-04 DIAGNOSIS — S30.1XXD CONTUSION OF ABDOMINAL WALL, SUBSEQUENT ENCOUNTER: ICD-10-CM

## 2022-11-04 PROCEDURE — G8754 DIAS BP LESS 90: HCPCS | Performed by: FAMILY MEDICINE

## 2022-11-04 PROCEDURE — 1101F PT FALLS ASSESS-DOCD LE1/YR: CPT | Performed by: FAMILY MEDICINE

## 2022-11-04 PROCEDURE — G8427 DOCREV CUR MEDS BY ELIG CLIN: HCPCS | Performed by: FAMILY MEDICINE

## 2022-11-04 PROCEDURE — 1123F ACP DISCUSS/DSCN MKR DOCD: CPT | Performed by: FAMILY MEDICINE

## 2022-11-04 PROCEDURE — 99213 OFFICE O/P EST LOW 20 MIN: CPT | Performed by: FAMILY MEDICINE

## 2022-11-04 PROCEDURE — G8417 CALC BMI ABV UP PARAM F/U: HCPCS | Performed by: FAMILY MEDICINE

## 2022-11-04 PROCEDURE — 3078F DIAST BP <80 MM HG: CPT | Performed by: FAMILY MEDICINE

## 2022-11-04 PROCEDURE — G0008 ADMIN INFLUENZA VIRUS VAC: HCPCS | Performed by: FAMILY MEDICINE

## 2022-11-04 PROCEDURE — 90694 VACC AIIV4 NO PRSRV 0.5ML IM: CPT | Performed by: FAMILY MEDICINE

## 2022-11-04 PROCEDURE — G8399 PT W/DXA RESULTS DOCUMENT: HCPCS | Performed by: FAMILY MEDICINE

## 2022-11-04 PROCEDURE — G8536 NO DOC ELDER MAL SCRN: HCPCS | Performed by: FAMILY MEDICINE

## 2022-11-04 PROCEDURE — G8510 SCR DEP NEG, NO PLAN REQD: HCPCS | Performed by: FAMILY MEDICINE

## 2022-11-04 PROCEDURE — 1090F PRES/ABSN URINE INCON ASSESS: CPT | Performed by: FAMILY MEDICINE

## 2022-11-04 PROCEDURE — 3074F SYST BP LT 130 MM HG: CPT | Performed by: FAMILY MEDICINE

## 2022-11-04 PROCEDURE — G8752 SYS BP LESS 140: HCPCS | Performed by: FAMILY MEDICINE

## 2022-11-04 NOTE — LETTER
NOTIFICATION RETURN TO WORK / SCHOOL    11/4/2022 3:40 PM    Ms. Fariha Giradr  [id number] [address]      To Whom It May Concern:    Fariha Girard is currently under the care of [facility]. She will return to work/school on: November 14, 2022    If there are questions or concerns please have the patient contact our office.         Sincerely,      Kinga Mayfield MD

## 2022-11-04 NOTE — PROGRESS NOTES
Patient identified by 2 identifiers. Chief Complaint   Patient presents with    Follow-up     1. Have you been to the ER, urgent care clinic since your last visit? Hospitalized since your last visit? Yes Where: [facility] 10/30/2022    2. Have you seen or consulted any other health care providers outside of the [address] [place] [address] since your last visit? Include any pap smears or colon screening. Yes Where: [facility]    Verbal Order with Readback given by Myron Menon MD for Influenza. Given in left Deltoid without difficulty.

## 2022-11-04 NOTE — PATIENT INSTRUCTIONS
Vaccine Information Statement    Influenza (Flu) Vaccine (Inactivated or Recombinant): What You Need to Know    Many vaccine information statements are available in Arabic and other languages. See www.immunize.org/vis. Hojas de información sobre vacunas están disponibles en español y en muchos otros idiomas. Visite www.immunize.org/vis. 1. Why get vaccinated? Influenza vaccine can prevent influenza (flu). Flu is a contagious disease that spreads around the [facility] every year, usually between October and May. Anyone can get the flu, but it is more dangerous for some people. Infants and young children, people 72 years and older, pregnant people, and people with certain health conditions or a weakened immune system are at greatest risk of flu complications. Pneumonia, bronchitis, sinus infections, and ear infections are examples of flu-related complications. If you have a medical condition, such as heart disease, cancer, or diabetes, flu can make it worse. Flu can cause fever and chills, sore throat, muscle aches, fatigue, cough, headache, and runny or stuffy nose. Some people may have vomiting and diarrhea, though this is more common in children than adults. In an average year, thousands of people in the [facility] die from flu, and many more are hospitalized. Flu vaccine prevents millions of illnesses and flu-related visits to the doctor each year. 2. Influenza vaccines     CDC recommends everyone 6 months and older get vaccinated every flu season. Children 6 months through 6years of age may need 2 doses during a single flu season. Everyone else needs only 1 dose each flu season. It takes about 2 weeks for protection to develop after vaccination. There are many flu viruses, and they are always changing. Each year a new flu vaccine is made to protect against the influenza viruses believed to be likely to cause disease in the upcoming flu season.  Even when the vaccine doesnt exactly match these viruses, it may still provide some protection. Influenza vaccine does not cause flu. Influenza vaccine may be given at the same time as other vaccines. 3. Talk with your health care provider    Tell your vaccination provider if the person getting the vaccine:  Has had an allergic reaction after a previous dose of influenza vaccine, or has any severe, life-threatening allergies   Has ever had Guillain-Barré Syndrome (also called GBS)    In some cases, your health care provider may decide to postpone influenza vaccination until a future visit. Influenza vaccine can be administered at any time during pregnancy. People who are or will be pregnant during influenza season should receive inactivated influenza vaccine. People with minor illnesses, such as a cold, may be vaccinated. People who are moderately or severely ill should usually wait until they recover before getting influenza vaccine. Your health care provider can give you more information. 4. Risks of a vaccine reaction    Soreness, redness, and swelling where the shot is given, fever, muscle aches, and headache can happen after influenza vaccination. There may be a very small increased risk of Guillain-Barré Syndrome (GBS) after inactivated influenza vaccine (the flu shot). Caterina Gaviria children who get the flu shot along with pneumococcal vaccine (PCV13) and/or DTaP vaccine at the same time might be slightly more likely to have a seizure caused by fever. Tell your health care provider if a child who is getting flu vaccine has ever had a seizure. People sometimes faint after medical procedures, including vaccination. Tell your provider if you feel dizzy or have vision changes or ringing in the ears. As with any medicine, there is a very remote chance of a vaccine causing a severe allergic reaction, other serious injury, or death. 5. What if there is a serious problem?     An allergic reaction could occur after the vaccinated person leaves the clinic. If you see signs of a severe allergic reaction (hives, swelling of the face and throat, difficulty breathing, a fast heartbeat, dizziness, or weakness), call 9-1-1 and get the person to the nearest hospital.    For other signs that concern you, call your health care provider. Adverse reactions should be reported to the Vaccine Adverse Event Reporting System (VAERS). Your health care provider will usually file this report, or you can do it yourself. Visit the VAERS website at www.vaers. [facility].gov or call 0-407.277.1842. VAERS is only for reporting reactions, and VAERS staff members do not give medical advice. 6. The National Vaccine Injury Compensation Program    The [facility] Vaccine Injury Compensation Program (VICP) is a federal program that was created to compensate people who may have been injured by certain vaccines. Claims regarding alleged injury or death due to vaccination have a time limit for filing, which may be as short as two years. Visit the VICP website at www.[facility].gov/vaccinecompensation or call 3-662.434.9310 to learn about the program and about filing a claim. 7. How can I learn more? Ask your health care provider. Call your local or state health department. Visit the website of the Food and Drug Administration (FDA) for vaccine package inserts and additional information at www.fda.gov/vaccines-blood-biologics/vaccines. Contact the Centers for Disease Control and Prevention (CDC): Call 1-506.332.3000 (4-947-UEZ-INFO) or  Visit CDCs influenza website at www.cdc.gov/flu. Vaccine Information Statement   Inactivated Influenza Vaccine   8/6/2021  42 MINI Martino [id number]   Department of Health and Human Services  Centers for Disease Control and Prevention    Office Use Only
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
## 2022-12-20 ENCOUNTER — OFFICE VISIT (OUTPATIENT)
Dept: FAMILY MEDICINE CLINIC | Age: 77
End: 2022-12-20
Payer: MEDICARE

## 2022-12-20 VITALS
OXYGEN SATURATION: 98 % | BODY MASS INDEX: 35.89 KG/M2 | WEIGHT: 210.2 LBS | RESPIRATION RATE: 12 BRPM | DIASTOLIC BLOOD PRESSURE: 67 MMHG | SYSTOLIC BLOOD PRESSURE: 123 MMHG | TEMPERATURE: 97.8 F | HEART RATE: 66 BPM | HEIGHT: 64 IN

## 2022-12-20 DIAGNOSIS — R73.02 IGT (IMPAIRED GLUCOSE TOLERANCE): ICD-10-CM

## 2022-12-20 DIAGNOSIS — E66.9 NON MORBID OBESITY: ICD-10-CM

## 2022-12-20 DIAGNOSIS — I10 ESSENTIAL HYPERTENSION: Primary | ICD-10-CM

## 2022-12-20 DIAGNOSIS — Z12.31 ENCOUNTER FOR SCREENING MAMMOGRAM FOR BREAST CANCER: ICD-10-CM

## 2022-12-20 DIAGNOSIS — Z51.81 ENCOUNTER FOR MEDICATION MONITORING: ICD-10-CM

## 2022-12-20 DIAGNOSIS — E78.2 MIXED HYPERLIPIDEMIA: ICD-10-CM

## 2022-12-20 PROBLEM — N18.30 CHRONIC RENAL DISEASE, STAGE III (HCC): Status: RESOLVED | Noted: 2022-09-19 | Resolved: 2022-12-20

## 2022-12-20 PROCEDURE — G8510 SCR DEP NEG, NO PLAN REQD: HCPCS | Performed by: FAMILY MEDICINE

## 2022-12-20 PROCEDURE — 1090F PRES/ABSN URINE INCON ASSESS: CPT | Performed by: FAMILY MEDICINE

## 2022-12-20 PROCEDURE — G8427 DOCREV CUR MEDS BY ELIG CLIN: HCPCS | Performed by: FAMILY MEDICINE

## 2022-12-20 PROCEDURE — 99213 OFFICE O/P EST LOW 20 MIN: CPT | Performed by: FAMILY MEDICINE

## 2022-12-20 PROCEDURE — G8399 PT W/DXA RESULTS DOCUMENT: HCPCS | Performed by: FAMILY MEDICINE

## 2022-12-20 PROCEDURE — 1123F ACP DISCUSS/DSCN MKR DOCD: CPT | Performed by: FAMILY MEDICINE

## 2022-12-20 PROCEDURE — 3078F DIAST BP <80 MM HG: CPT | Performed by: FAMILY MEDICINE

## 2022-12-20 PROCEDURE — G8752 SYS BP LESS 140: HCPCS | Performed by: FAMILY MEDICINE

## 2022-12-20 PROCEDURE — 1101F PT FALLS ASSESS-DOCD LE1/YR: CPT | Performed by: FAMILY MEDICINE

## 2022-12-20 PROCEDURE — G8536 NO DOC ELDER MAL SCRN: HCPCS | Performed by: FAMILY MEDICINE

## 2022-12-20 PROCEDURE — G8754 DIAS BP LESS 90: HCPCS | Performed by: FAMILY MEDICINE

## 2022-12-20 PROCEDURE — 3074F SYST BP LT 130 MM HG: CPT | Performed by: FAMILY MEDICINE

## 2022-12-20 PROCEDURE — G8417 CALC BMI ABV UP PARAM F/U: HCPCS | Performed by: FAMILY MEDICINE
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
## 2022-12-20 NOTE — PROGRESS NOTES
Patient identified by 2 identifiers. Chief Complaint   Patient presents with    Follow-up    Hypertension     1. Have you been to the ER, urgent care clinic since your last visit? Hospitalized since your last visit? No    2. Have you seen or consulted any other health care providers outside of the [address] [place] [address] since your last visit? Include any pap smears or colon screening.  No

## 2022-12-21 LAB
ANION GAP SERPL CALC-SCNC: 2 MMOL/L (ref 5–15)
BUN SERPL-MCNC: 12 MG/DL (ref 6–20)
BUN/CREAT SERPL: 14 (ref 12–20)
CALCIUM SERPL-MCNC: 9.5 MG/DL (ref 8.5–10.1)
CHLORIDE SERPL-SCNC: 104 MMOL/L (ref 97–108)
CO2 SERPL-SCNC: 34 MMOL/L (ref 21–32)
CREAT SERPL-MCNC: 0.84 MG/DL (ref 0.55–1.02)
GLUCOSE SERPL-MCNC: 88 MG/DL (ref 65–100)
POTASSIUM SERPL-SCNC: 4 MMOL/L (ref 3.5–5.1)
SODIUM SERPL-SCNC: 140 MMOL/L (ref 136–145)

## 2023-01-17 ENCOUNTER — TELEPHONE (OUTPATIENT)
Dept: FAMILY MEDICINE CLINIC | Age: 78
End: 2023-01-17

## 2023-01-17 NOTE — TELEPHONE ENCOUNTER
Patient called stating she tested positive for covid 1/14. She requested a call back from the nurse to discuss what she needs to do. She can be reached at [phone].

## 2023-01-17 NOTE — TELEPHONE ENCOUNTER
Devin BARFIELD stated she tested positive for COVID on 1/14/23 and she has cough and congestion. She is fine and taking no OTC medications and does not need any. She is only drinking green tea with honey. But I told he she could take mucinex  and claritin if need and tylenol if she develops charly fever or bodyaches. She just wanted Dr. Senia Gonzalez to know that she was COVID positive.

## 2023-01-19 ENCOUNTER — TELEPHONE (OUTPATIENT)
Dept: FAMILY MEDICINE CLINIC | Age: 78
End: 2023-01-19

## 2023-02-27 ENCOUNTER — OFFICE VISIT (OUTPATIENT)
Dept: FAMILY MEDICINE CLINIC | Age: 78
End: 2023-02-27

## 2023-02-27 VITALS
TEMPERATURE: 98.6 F | DIASTOLIC BLOOD PRESSURE: 76 MMHG | HEART RATE: 65 BPM | OXYGEN SATURATION: 97 % | SYSTOLIC BLOOD PRESSURE: 138 MMHG | BODY MASS INDEX: 36.54 KG/M2 | HEIGHT: 64 IN | WEIGHT: 214 LBS | RESPIRATION RATE: 15 BRPM

## 2023-02-27 DIAGNOSIS — J06.9 UPPER RESPIRATORY TRACT INFECTION, UNSPECIFIED TYPE: Primary | ICD-10-CM

## 2023-02-27 DIAGNOSIS — J98.01 BRONCHOSPASM: ICD-10-CM

## 2023-02-27 LAB — SARS-COV-2 PCR, POC: NEGATIVE

## 2023-02-27 PROCEDURE — 3075F SYST BP GE 130 - 139MM HG: CPT | Performed by: FAMILY MEDICINE

## 2023-02-27 PROCEDURE — G8427 DOCREV CUR MEDS BY ELIG CLIN: HCPCS | Performed by: FAMILY MEDICINE

## 2023-02-27 PROCEDURE — 3078F DIAST BP <80 MM HG: CPT | Performed by: FAMILY MEDICINE

## 2023-02-27 PROCEDURE — 1123F ACP DISCUSS/DSCN MKR DOCD: CPT | Performed by: FAMILY MEDICINE

## 2023-02-27 PROCEDURE — 99213 OFFICE O/P EST LOW 20 MIN: CPT | Performed by: FAMILY MEDICINE

## 2023-02-27 PROCEDURE — G8417 CALC BMI ABV UP PARAM F/U: HCPCS | Performed by: FAMILY MEDICINE

## 2023-02-27 PROCEDURE — 1090F PRES/ABSN URINE INCON ASSESS: CPT | Performed by: FAMILY MEDICINE

## 2023-02-27 PROCEDURE — G8399 PT W/DXA RESULTS DOCUMENT: HCPCS | Performed by: FAMILY MEDICINE

## 2023-02-27 PROCEDURE — G8510 SCR DEP NEG, NO PLAN REQD: HCPCS | Performed by: FAMILY MEDICINE

## 2023-02-27 PROCEDURE — 87635 SARS-COV-2 COVID-19 AMP PRB: CPT | Performed by: FAMILY MEDICINE

## 2023-02-27 PROCEDURE — G8536 NO DOC ELDER MAL SCRN: HCPCS | Performed by: FAMILY MEDICINE

## 2023-02-27 PROCEDURE — 1101F PT FALLS ASSESS-DOCD LE1/YR: CPT | Performed by: FAMILY MEDICINE

## 2023-02-27 RX ORDER — PREDNISONE 10 MG/1
10 TABLET ORAL 2 TIMES DAILY
Qty: 10 TABLET | Refills: 0 | Status: SHIPPED | OUTPATIENT
Start: 2023-02-27 | End: 2023-03-04

## 2023-02-27 RX ORDER — PROMETHAZINE HYDROCHLORIDE AND DEXTROMETHORPHAN HYDROBROMIDE 6.25; 15 MG/5ML; MG/5ML
5 SYRUP ORAL
Qty: 180 ML | Refills: 1 | Status: SHIPPED | OUTPATIENT
Start: 2023-02-27

## 2023-02-27 RX ORDER — AZITHROMYCIN 250 MG/1
TABLET, FILM COATED ORAL
Qty: 6 TABLET | Refills: 0 | Status: SHIPPED | OUTPATIENT
Start: 2023-02-27

## 2023-02-27 RX ORDER — LORATADINE 10 MG/1
10 CAPSULE, LIQUID FILLED ORAL
COMMUNITY

## 2023-02-27 RX ORDER — AMLODIPINE BESYLATE 5 MG/1
TABLET ORAL
Qty: 90 TABLET | Refills: 2 | Status: SHIPPED | OUTPATIENT
Start: 2023-02-27

## 2023-02-27 NOTE — PROGRESS NOTES
HISTORY OF PRESENT ILLNESS  Meir Salter is a 65 y.o. female. HPI  C/o nasal congestion and cough for the past few days. Coughing up phlegm which has been yellowish with some blood noted in it. Also blowing out mucous from her nose that has been thick and mixed with blood. No fever but has flet cold but no chills. Has malaise. No sore throat. Has post nasal drainage. No chest pain or SOB. No body aches. Has had some intermittent wheezing noted. Increased cough at night. Chest is sore from the coughing. No known sick contacts. Has been taking mucinex DM, theraflu and claritin for her sx. Patient Active Problem List   Diagnosis Code    Hypovitaminosis D E55.9    Hyperlipidemia E78.5    Asymptomatic PVCs/APC's I49.3    IGT (impaired glucose tolerance) R73.02    Essential hypertension I10    Encounter for medication monitoring Z51.81    Severe obesity (BMI 35.0-39. 9) with comorbidity (HCC) E66.01       Current Outpatient Medications   Medication Sig Dispense Refill    amLODIPine (NORVASC) 5 mg tablet TAKE 1 TABLET EVERY DAY 90 Tablet 2    guaiFENesin-dextromethorphan SR (Mucinex DM) 600-30 mg per tablet Take 1 Tablet by mouth every twelve (12) hours as needed for Cough. loratadine (Claritin Liqui-Gel) 10 mg cap Take 10 mg by mouth. Take 1 tablet daily as needed      azithromycin (ZITHROMAX) 250 mg tablet Take 2 tablets today, then take 1 tablet daily 6 Tablet 0    predniSONE (DELTASONE) 10 mg tablet Take 10 mg by mouth two (2) times a day for 5 days. 10 Tablet 0    promethazine-dextromethorphan (PROMETHAZINE-DM) 6.25-15 mg/5 mL syrup Take 5 mL by mouth every six (6) hours as needed for Cough. 180 mL 1    APPLE CIDER VINEGAR PO Take 2 Caplet by mouth every Monday and Thursday.       triamterene-hydroCHLOROthiazide (DYAZIDE) 37.5-25 mg per capsule TAKE 1 CAPSULE EVERY DAY 90 Capsule 3    potassium chloride SR (KLOR-CON 10) 10 mEq tablet TAKE 1 TABLET BY MOUTH 2 TIMES A WEEK 24 Tablet 3 cholecalciferol, vitamin D3, 50 mcg (2,000 unit) tab Take 2,000 Units by mouth daily. ferrous sulfate 325 mg (65 mg iron) tablet Take 325 mg by mouth two (2) times a week. allopurinoL (ZYLOPRIM) 100 mg tablet Take 1 Tablet by mouth daily. (Patient not taking: Reported on 2/27/2023) 30 Tablet 5       Allergies   Allergen Reactions    Lisinopril-Hydrochlorothiazide Shortness of Breath     fatigue  fatigue    Triamterene Swelling and Other (comments)     Diazide is ok. ...just it's generic. Past Medical History:   Diagnosis Date    Gout     Hypertension          Past Surgical History:   Procedure Laterality Date    HX BREAST BIOPSY Right     benign bx age 41's    HX DILATION AND CURETTAGE      [place] COLONOSCOPY FLX DX W/COLLJ SPEC WHEN PFRMD      \"long time ago\"         Family History   Problem Relation Age of Onset    Anemia Mother     Heart Failure Mother     Heart Failure Father     Dementia Father         alzheimers    Cataract Sister     No Known Problems Brother     No Known Problems Brother     No Known Problems Brother     Heart Attack Sister     No Known Problems Brother     No Known Problems Sister        Social History     Tobacco Use    Smoking status: Never    Smokeless tobacco: Never   Substance Use Topics    Alcohol use: Yes     Alcohol/week: 0.0 standard drinks     Comment: rarely       Review of Systems   Constitutional:  Positive for malaise/fatigue. Negative for chills and fever. HENT:  Positive for congestion. Negative for sore throat. Respiratory:  Positive for cough, hemoptysis, sputum production and wheezing. Negative for shortness of breath. Cardiovascular:  Negative for chest pain. Gastrointestinal:  Negative for abdominal pain. Neurological:  Negative for dizziness and headaches. Physical Exam  Vitals and nursing note reviewed. Constitutional:       Appearance: Normal appearance. She is well-developed.       Comments: /76 (BP 1 Location: Left arm, BP Patient Position: Sitting, BP Cuff Size: Large adult)   Pulse 65   Temp 98.6 °F (37 °C) (Oral)   Resp 15   Ht 5' 4\" (1.626 m)   Wt 214 lb (97.1 kg)   LMP 08/15/1994   SpO2 97%   BMI 36.73 kg/m²    HENT:      Right Ear: Tympanic membrane and ear canal normal.      Left Ear: Tympanic membrane and ear canal normal.      Nose: Mucosal edema and rhinorrhea present. Mouth/Throat:      Pharynx: No oropharyngeal exudate or posterior oropharyngeal erythema. Neck:      Thyroid: No thyromegaly. Trachea: Trachea normal.   Cardiovascular:      Rate and Rhythm: Normal rate and regular rhythm. Pulmonary:      Effort: Pulmonary effort is normal.      Breath sounds: Wheezing (faint) present. No rales. Abdominal:      General: Bowel sounds are normal.      Palpations: Abdomen is soft. Tenderness: There is no abdominal tenderness. Musculoskeletal:      Cervical back: Full passive range of motion without pain, normal range of motion and neck supple. No edema. Lymphadenopathy:      Cervical: No cervical adenopathy. ASSESSMENT and PLAN  Diagnoses and all orders for this visit:    1. Upper respiratory tract infection, unspecified type  -     POCT COVID-19, SARS-COV-2, PCR  -  negative. -     XR CHEST PA LAT; Future  -  clear lungs  -     azithromycin (ZITHROMAX) 250 mg tablet; Take 2 tablets today, then take 1 tablet daily  -     promethazine-dextromethorphan (PROMETHAZINE-DM) 6.25-15 mg/5 mL syrup; Take 5 mL by mouth every six (6) hours as needed for Cough. 2. Bronchospasm  -     predniSONE (DELTASONE) 10 mg tablet; Take 10 mg by mouth two (2) times a day for 5 days. reviewed medications and side effects in detail    I have discussed diagnosis listed in this note with pt and/or family. I have discussed treatment plans and options and the risk/benefit analysis of those options, including safe use of medications and possible medication side effects.    Through the use of shared decision making we have agreed to the above plan. The patient has received an after-visit summary and questions were answered concerning future plans and follow up. Advise pt of any urgent changes then to proceed to the ER.

## 2023-02-27 NOTE — PROGRESS NOTES
Identified pt with two pt identifiers(name and ). Reviewed record in preparation for visit and have obtained necessary documentation. Chief Complaint   Patient presents with    Cough     Congestion and cough x 4 days           Vitals:    23 1528   BP: 138/76   Pulse: 65   Resp: 15   Temp: 98.6 °F (37 °C)   TempSrc: Oral   SpO2: 97%   Weight: 214 lb (97.1 kg)   Height: 5' 4\" (1.626 m)   LMP: 08/15/1994     Results for orders placed or performed in visit on 23   POCT COVID-19, SARS-COV-2, PCR   Result Value Ref Range    SARS-COV-2 PCR, POC Negative Negative         There are no preventive care reminders to display for this patient. Coordination of Care Questionnaire:  :   1) Have you been to an emergency room, urgent care, or hospitalized since your last visit? If yes, where when, and reason for visit? no       2. Have seen or consulted any other health care provider since your last visit? If yes, where when, and reason for visit? NO      Patient is accompanied by self I have received verbal consent from Fausto Trent to discuss any/all medical information while they are present in the room.

## 2023-03-10 ENCOUNTER — OFFICE VISIT (OUTPATIENT)
Dept: FAMILY MEDICINE CLINIC | Age: 78
End: 2023-03-10

## 2023-03-10 VITALS
RESPIRATION RATE: 14 BRPM | HEIGHT: 64 IN | SYSTOLIC BLOOD PRESSURE: 132 MMHG | HEART RATE: 61 BPM | TEMPERATURE: 98.8 F | OXYGEN SATURATION: 99 % | DIASTOLIC BLOOD PRESSURE: 72 MMHG | BODY MASS INDEX: 35.78 KG/M2 | WEIGHT: 209.6 LBS

## 2023-03-10 DIAGNOSIS — R25.2 LEG CRAMPS: ICD-10-CM

## 2023-03-10 DIAGNOSIS — Z51.81 ENCOUNTER FOR MEDICATION MONITORING: ICD-10-CM

## 2023-03-10 DIAGNOSIS — R73.02 IGT (IMPAIRED GLUCOSE TOLERANCE): ICD-10-CM

## 2023-03-10 DIAGNOSIS — I49.9 IRREGULAR HEARTBEAT: Primary | ICD-10-CM

## 2023-03-10 DIAGNOSIS — R42 LIGHT-HEADEDNESS: ICD-10-CM

## 2023-03-10 DIAGNOSIS — E78.2 MIXED HYPERLIPIDEMIA: ICD-10-CM

## 2023-03-10 DIAGNOSIS — I10 ESSENTIAL HYPERTENSION: ICD-10-CM

## 2023-03-10 LAB
ALBUMIN SERPL-MCNC: 3.6 G/DL (ref 3.5–5)
ALBUMIN/GLOB SERPL: 1 (ref 1.1–2.2)
ALP SERPL-CCNC: 72 U/L (ref 45–117)
ALT SERPL-CCNC: 30 U/L (ref 12–78)
ANION GAP SERPL CALC-SCNC: 4 MMOL/L (ref 5–15)
AST SERPL-CCNC: 19 U/L (ref 15–37)
BILIRUB SERPL-MCNC: 0.3 MG/DL (ref 0.2–1)
BUN SERPL-MCNC: 15 MG/DL (ref 6–20)
BUN/CREAT SERPL: 18 (ref 12–20)
CALCIUM SERPL-MCNC: 9.6 MG/DL (ref 8.5–10.1)
CHLORIDE SERPL-SCNC: 100 MMOL/L (ref 97–108)
CHOLEST SERPL-MCNC: 181 MG/DL
CO2 SERPL-SCNC: 36 MMOL/L (ref 21–32)
CREAT SERPL-MCNC: 0.85 MG/DL (ref 0.55–1.02)
ERYTHROCYTE [DISTWIDTH] IN BLOOD BY AUTOMATED COUNT: 17.2 % (ref 11.5–14.5)
EST. AVERAGE GLUCOSE BLD GHB EST-MCNC: 134 MG/DL
GLOBULIN SER CALC-MCNC: 3.6 G/DL (ref 2–4)
GLUCOSE SERPL-MCNC: 92 MG/DL (ref 65–100)
HBA1C MFR BLD: 6.3 % (ref 4–5.6)
HCT VFR BLD AUTO: 43.4 % (ref 35–47)
HDLC SERPL-MCNC: 65 MG/DL
HDLC SERPL: 2.8 (ref 0–5)
HGB BLD-MCNC: 12.8 G/DL (ref 11.5–16)
LDLC SERPL CALC-MCNC: 99.8 MG/DL (ref 0–100)
MAGNESIUM SERPL-MCNC: 2 MG/DL (ref 1.6–2.4)
MCH RBC QN AUTO: 23.7 PG (ref 26–34)
MCHC RBC AUTO-ENTMCNC: 29.5 G/DL (ref 30–36.5)
MCV RBC AUTO: 80.2 FL (ref 80–99)
NRBC # BLD: 0 K/UL (ref 0–0.01)
NRBC BLD-RTO: 0 PER 100 WBC
PLATELET # BLD AUTO: 291 K/UL (ref 150–400)
PMV BLD AUTO: 10.3 FL (ref 8.9–12.9)
POTASSIUM SERPL-SCNC: 3.9 MMOL/L (ref 3.5–5.1)
PROT SERPL-MCNC: 7.2 G/DL (ref 6.4–8.2)
RBC # BLD AUTO: 5.41 M/UL (ref 3.8–5.2)
SODIUM SERPL-SCNC: 140 MMOL/L (ref 136–145)
TRIGL SERPL-MCNC: 81 MG/DL (ref ?–150)
TSH SERPL DL<=0.05 MIU/L-ACNC: 0.82 UIU/ML (ref 0.36–3.74)
VLDLC SERPL CALC-MCNC: 16.2 MG/DL
WBC # BLD AUTO: 6.8 K/UL (ref 3.6–11)

## 2023-03-10 NOTE — PROGRESS NOTES
HISTORY OF PRESENT ILLNESS  Rigoberto Villarreal is a 65 y.o. female. HPI   C/o irregular heart beat over the past week that has been on her monitor when she has been taking her BP at home. Denies feeling any fluttering or palpitations she has felt. Has been having periods of feeling lightheaded that comes and goes but only last for a 1 to 2 minutes and then passes in the past couple of weeks but thought that this was from stress from losing 2 of her cousins. Has been compliant with taking her medications. Cardiovascular Review:  The patient has hypertension and hyperlipidemia. Tolerating Norvasc well. No chest pain and no SOB. Diet and Lifestyle: generally follows a low fat low cholesterol diet, generally follows a low sodium diet, exercises regularly  Home BP Monitoring: Ranging 120-130ss/80s. Pertinent ROS: taking medications as instructed, no TIA's, no chest pain on exertion, no dyspnea on exertion, noting improvement in swelling in swelling. Glucose intolerance reveiw:  She has IGT. A1c level is stable at 5.6%. Diabetic ROS - further diabetic ROS: no polyuria or polydipsia, no chest pain, dyspnea or TIA's, no numbness, tingling or pain in extremities, no unusual visual symptoms. Lab review: orders written for new lab studies as appropriate; see orders. HM:  Mammogram 11/18/2021  Bone density 4/17/2018    Patient Active Problem List   Diagnosis Code    Hypovitaminosis D E55.9    Hyperlipidemia E78.5    Asymptomatic PVCs/APC's I49.3    IGT (impaired glucose tolerance) R73.02    Essential hypertension I10    Encounter for medication monitoring Z51.81    Severe obesity (BMI 35.0-39. 9) with comorbidity (HCC) E66.01       Current Outpatient Medications   Medication Sig Dispense Refill    amLODIPine (NORVASC) 5 mg tablet TAKE 1 TABLET EVERY DAY 90 Tablet 2    guaiFENesin-dextromethorphan SR (Mucinex DM) 600-30 mg per tablet Take 1 Tablet by mouth every twelve (12) hours as needed for Cough. loratadine (Claritin Liqui-Gel) 10 mg cap Take 10 mg by mouth. Take 1 tablet daily as needed      azithromycin (ZITHROMAX) 250 mg tablet Take 2 tablets today, then take 1 tablet daily 6 Tablet 0    promethazine-dextromethorphan (PROMETHAZINE-DM) 6.25-15 mg/5 mL syrup Take 5 mL by mouth every six (6) hours as needed for Cough. 180 mL 1    APPLE CIDER VINEGAR PO Take 2 Caplet by mouth every Monday and Thursday. allopurinoL (ZYLOPRIM) 100 mg tablet Take 1 Tablet by mouth daily. (Patient not taking: Reported on 2/27/2023) 30 Tablet 5    triamterene-hydroCHLOROthiazide (DYAZIDE) 37.5-25 mg per capsule TAKE 1 CAPSULE EVERY DAY 90 Capsule 3    potassium chloride SR (KLOR-CON 10) 10 mEq tablet TAKE 1 TABLET BY MOUTH 2 TIMES A WEEK 24 Tablet 3    cholecalciferol, vitamin D3, 50 mcg (2,000 unit) tab Take 2,000 Units by mouth daily. ferrous sulfate 325 mg (65 mg iron) tablet Take 325 mg by mouth two (2) times a week. Allergies   Allergen Reactions    Lisinopril-Hydrochlorothiazide Shortness of Breath     fatigue  fatigue    Triamterene Swelling and Other (comments)     Diazide is ok. ...just it's generic. Past Medical History:   Diagnosis Date    Gout     Hypertension          Past Surgical History:   Procedure Laterality Date    HX BREAST BIOPSY Right     benign bx age 41's    HX DILATION AND CURETTAGE      [place] COLONOSCOPY FLX DX W/COLLJ SPEC WHEN PFRMD      \"long time ago\"           Family History   Problem Relation Age of Onset    Anemia Mother     Heart Failure Mother     Heart Failure Father     Dementia Father         alzheimers    Cataract Sister     No Known Problems Brother     No Known Problems Brother     No Known Problems Brother     Heart Attack Sister     No Known Problems Brother     No Known Problems Sister        Social History     Tobacco Use    Smoking status: Never    Smokeless tobacco: Never   Substance Use Topics    Alcohol use:  Yes     Alcohol/week: 0.0 standard drinks Comment: rarely        Lab Results   Component Value Date/Time    WBC 6.9 05/27/2022 11:39 AM    HGB 13.1 05/27/2022 11:39 AM    Hemoglobin (POC) 15.3 12/26/2014 05:11 PM    HCT 43.3 05/27/2022 11:39 AM    Hematocrit (POC) 45 12/26/2014 05:11 PM    PLATELET 924 63/36/2792 11:39 AM    MCV 79.2 (L) 05/27/2022 11:39 AM     Lab Results   Component Value Date/Time    Cholesterol, total 160 05/27/2022 11:39 AM    HDL Cholesterol 67 05/27/2022 11:39 AM    LDL, calculated 76.4 05/27/2022 11:39 AM    Triglyceride 83 05/27/2022 11:39 AM    CHOL/HDL Ratio 2.4 05/27/2022 11:39 AM     Lab Results   Component Value Date/Time    TSH 0.876 10/15/2014 09:30 AM    T4, Free 1.14 10/15/2014 09:30 AM      Lab Results   Component Value Date/Time    Sodium 140 12/20/2022 12:11 PM    Potassium 4.0 12/20/2022 12:11 PM    Chloride 104 12/20/2022 12:11 PM    CO2 34 (H) 12/20/2022 12:11 PM    Anion gap 2 (L) 12/20/2022 12:11 PM    Glucose 88 12/20/2022 12:11 PM    BUN 12 12/20/2022 12:11 PM    Creatinine 0.84 12/20/2022 12:11 PM    BUN/Creatinine ratio 14 12/20/2022 12:11 PM    GFR est AA >60 05/27/2022 11:39 AM    GFR est non-AA 58 (L) 05/27/2022 11:39 AM    Calcium 9.5 12/20/2022 12:11 PM    Bilirubin, total 0.4 05/27/2022 11:39 AM    ALT (SGPT) 34 05/27/2022 11:39 AM    Alk. phosphatase 76 05/27/2022 11:39 AM    Protein, total 7.7 05/27/2022 11:39 AM    Albumin 3.9 05/27/2022 11:39 AM    Globulin 3.8 05/27/2022 11:39 AM    A-G Ratio 1.0 (L) 05/27/2022 11:39 AM      Lab Results   Component Value Date/Time    Hemoglobin A1c 6.0 (H) 02/17/2021 09:18 AM    Hemoglobin A1c (POC) 5.6 05/27/2022 01:12 AM         Review of Systems   Constitutional:  Negative for malaise/fatigue. HENT:  Negative for congestion. Eyes:  Negative for blurred vision. Respiratory:  Negative for cough and shortness of breath. Cardiovascular:  Negative for chest pain, palpitations and leg swelling.    Gastrointestinal:  Negative for abdominal pain, constipation and heartburn. Genitourinary:  Negative for dysuria, frequency and urgency. Musculoskeletal:  Negative for back pain and joint pain. Has had some occasional leg cramps. Neurological:  Negative for dizziness, tingling, focal weakness and headaches. Endo/Heme/Allergies:  Negative for environmental allergies. Psychiatric/Behavioral:  Negative for depression. The patient does not have insomnia. Physical Exam  Vitals and nursing note reviewed. Constitutional:       Appearance: Normal appearance. She is well-developed. Comments: /72   Pulse 61   Temp 98.8 °F (37.1 °C)   Resp 14   Ht 5' 4\" (1.626 m)   Wt 209 lb 9.6 oz (95.1 kg)   LMP 08/15/1994   SpO2 99%   BMI 35.98 kg/m²    HENT:      Right Ear: Tympanic membrane and ear canal normal.      Left Ear: Tympanic membrane and ear canal normal.   Neck:      Thyroid: No thyromegaly. Cardiovascular:      Rate and Rhythm: Normal rate and regular rhythm. Heart sounds: Normal heart sounds. Pulmonary:      Effort: Pulmonary effort is normal.      Breath sounds: Normal breath sounds. Abdominal:      General: Bowel sounds are normal.      Palpations: Abdomen is soft. There is no mass. Tenderness: There is no abdominal tenderness. Musculoskeletal:         General: Normal range of motion. Cervical back: Normal range of motion and neck supple. Right lower leg: No edema. Left lower leg: No edema. Lymphadenopathy:      Cervical: No cervical adenopathy. Skin:     General: Skin is warm and dry. Neurological:      General: No focal deficit present. Mental Status: She is alert and oriented to person, place, and time. Psychiatric:         Mood and Affect: Mood normal.       ASSESSMENT and PLAN  Diagnoses and all orders for this visit:    1. Irregular heartbeat  -     TSH 3RD GENERATION; Future  -     AMB POC EKG ROUTINE W/ 12 LEADS, INTER & REP  -  NSR, WNL. -     CARDIAC HOLTER MONITOR; Future    2. Light-headedness  Increase hydration.    -     CARDIAC HOLTER MONITOR; Future    3. Essential hypertension  Discussed sodium restriction, high k rich diet, maintaining ideal body weight and regular exercise program such as daily walking 30 min perday 4-5 times per week, as physiologic means to achieve blood pressure control. Medication compliance advised. 4. Mixed hyperlipidemia  -     LIPID PANEL; Future    5. IGT (impaired glucose tolerance)  -     HEMOGLOBIN A1C WITH EAG; Future    6. Leg cramps  -     MAGNESIUM; Future    7. Encounter for medication monitoring  -     CBC W/O DIFF; Future  -     METABOLIC PANEL, COMPREHENSIVE; Future      Follow-up and Dispositions    Return in about 1 month (around 4/10/2023). reviewed diet, exercise and weight control  cardiovascular risk and specific lipid/LDL goals reviewed  reviewed medications and side effects in detail  specific diabetic recommendations: low cholesterol diet, weight control and daily exercise discussed and glycohemoglobin and other lab monitoring discussed    I have discussed diagnosis listed in this note with pt and/or family. I have discussed treatment plans and options and the risk/benefit analysis of those options, including safe use of medications and possible medication side effects. Through the use of shared decision making we have agreed to the above plan. The patient has received an after-visit summary and questions were answered concerning future plans and follow up. Advise pt of any urgent changes then to proceed to the ER.

## 2023-03-10 NOTE — PROGRESS NOTES
Patient identified by 2 identifiers. Chief Complaint   Patient presents with    Follow-up    Hypertension     Irregular Heart Beat     1. Have you been to the ER, urgent care clinic since your last visit? Hospitalized since your last visit? No    2. Have you seen or consulted any other health care providers outside of the [address] [place] [address] since your last visit? Include any pap smears or colon screening.  No

## 2023-03-13 ENCOUNTER — TELEPHONE (OUTPATIENT)
Dept: FAMILY MEDICINE CLINIC | Age: 78
End: 2023-03-13

## 2023-03-13 NOTE — TELEPHONE ENCOUNTER
Spoke to the pt and she is scheduled for Thursday, 3/16/23 at 10:30 am @ [id number] [facility].

## 2023-03-13 NOTE — TELEPHONE ENCOUNTER
----- Message from Da Osorio sent at 3/13/2023  9:09 AM EDT -----  Subject: Message to Provider    QUESTIONS  Information for Provider? Pt is calling in because dr told her the   hospital will be giving her a heart monitor but she still hasn't heard   anything from the hospital and she is still having dizziness.     Roger Vázquez INFO  [phone]; OK to leave message on voicemail

## 2023-03-13 NOTE — TELEPHONE ENCOUNTER
Patient would like for Kieran Persons to know she can not get a heart monitor until 3/16 from [facility] she can be reached @ [phone]

## 2023-03-16 ENCOUNTER — HOSPITAL ENCOUNTER (OUTPATIENT)
Dept: NON INVASIVE DIAGNOSTICS | Age: 78
Discharge: HOME | End: 2023-03-16
Attending: FAMILY MEDICINE
Payer: MEDICARE

## 2023-03-16 DIAGNOSIS — R42 LIGHT-HEADEDNESS: ICD-10-CM

## 2023-03-16 DIAGNOSIS — I49.9 IRREGULAR HEARTBEAT: ICD-10-CM

## 2023-03-16 PROCEDURE — 93225 XTRNL ECG REC<48 HRS REC: CPT

## 2023-03-20 ENCOUNTER — TELEPHONE (OUTPATIENT)
Dept: FAMILY MEDICINE CLINIC | Age: 78
End: 2023-03-20

## 2023-03-20 NOTE — TELEPHONE ENCOUNTER
Patient would like for Kameron Johnson to know she just drop off her heart monitor at [facility]

## 2023-03-30 ENCOUNTER — TELEPHONE (OUTPATIENT)
Dept: FAMILY MEDICINE CLINIC | Age: 78
End: 2023-03-30

## 2023-03-30 DIAGNOSIS — I49.9 IRREGULAR HEARTBEAT: Primary | ICD-10-CM

## 2023-03-30 DIAGNOSIS — I47.1 ATRIAL TACHYCARDIA (HCC): ICD-10-CM

## 2023-03-30 DIAGNOSIS — I49.3 FREQUENT PVCS: ICD-10-CM

## 2023-03-30 DIAGNOSIS — R42 LIGHT-HEADEDNESS: ICD-10-CM

## 2023-03-31 ENCOUNTER — TELEPHONE (OUTPATIENT)
Dept: FAMILY MEDICINE CLINIC | Age: 78
End: 2023-03-31

## 2023-03-31 NOTE — TELEPHONE ENCOUNTER
----- Message from Mariangel Gould sent at 3/31/2023 11:01 AM EDT -----  Subject: Message to Provider    QUESTIONS  Information for Provider? Pt had a heart monitor and returned it on or   about 3/18/23 and is calling for the results. Also, she received a Patient   Alert call from [facility]. She is unaware why they would be   calling. She also received a second call saying she has an appt on 4/3/23   at 9:45 am Can you please call pt to provide results and let her know who   scheduled vascular appt. She has not responded to the appointment at this   time. ---------------------------------------------------------------------------  --------------  Shayy Garcia [facility]  [phone]; OK to leave message on voicemail  ---------------------------------------------------------------------------  --------------  SCRIPT ANSWERS  Relationship to Patient?  Self

## 2023-03-31 NOTE — TELEPHONE ENCOUNTER
Returned pt call to let her know Robyn Duron called and mad an appointment for her to see Dr. Kannan Hurt and left it on her voicemail. Called pt twice no answer.

## 2023-04-17 ENCOUNTER — OFFICE VISIT (OUTPATIENT)
Dept: FAMILY MEDICINE CLINIC | Age: 78
End: 2023-04-17
Payer: MEDICARE

## 2023-04-17 VITALS
OXYGEN SATURATION: 94 % | TEMPERATURE: 97.9 F | RESPIRATION RATE: 12 BRPM | HEIGHT: 64 IN | DIASTOLIC BLOOD PRESSURE: 78 MMHG | BODY MASS INDEX: 36.33 KG/M2 | SYSTOLIC BLOOD PRESSURE: 131 MMHG | HEART RATE: 70 BPM | WEIGHT: 212.8 LBS

## 2023-04-17 DIAGNOSIS — I49.9 IRREGULAR HEARTBEAT: ICD-10-CM

## 2023-04-17 DIAGNOSIS — E78.2 MIXED HYPERLIPIDEMIA: ICD-10-CM

## 2023-04-17 DIAGNOSIS — R73.02 IGT (IMPAIRED GLUCOSE TOLERANCE): ICD-10-CM

## 2023-04-17 DIAGNOSIS — I10 ESSENTIAL HYPERTENSION: Primary | ICD-10-CM

## 2023-04-17 DIAGNOSIS — I49.3 FREQUENT PVCS: ICD-10-CM

## 2023-04-17 DIAGNOSIS — Z51.81 ENCOUNTER FOR MEDICATION MONITORING: ICD-10-CM

## 2023-04-17 PROCEDURE — 1090F PRES/ABSN URINE INCON ASSESS: CPT | Performed by: FAMILY MEDICINE

## 2023-04-17 PROCEDURE — G8427 DOCREV CUR MEDS BY ELIG CLIN: HCPCS | Performed by: FAMILY MEDICINE

## 2023-04-17 PROCEDURE — 3075F SYST BP GE 130 - 139MM HG: CPT | Performed by: FAMILY MEDICINE

## 2023-04-17 PROCEDURE — G8510 SCR DEP NEG, NO PLAN REQD: HCPCS | Performed by: FAMILY MEDICINE

## 2023-04-17 PROCEDURE — G8536 NO DOC ELDER MAL SCRN: HCPCS | Performed by: FAMILY MEDICINE

## 2023-04-17 PROCEDURE — 99213 OFFICE O/P EST LOW 20 MIN: CPT | Performed by: FAMILY MEDICINE

## 2023-04-17 PROCEDURE — 1123F ACP DISCUSS/DSCN MKR DOCD: CPT | Performed by: FAMILY MEDICINE

## 2023-04-17 PROCEDURE — 3078F DIAST BP <80 MM HG: CPT | Performed by: FAMILY MEDICINE

## 2023-04-17 PROCEDURE — 1101F PT FALLS ASSESS-DOCD LE1/YR: CPT | Performed by: FAMILY MEDICINE

## 2023-04-17 PROCEDURE — G8417 CALC BMI ABV UP PARAM F/U: HCPCS | Performed by: FAMILY MEDICINE

## 2023-04-17 PROCEDURE — G8399 PT W/DXA RESULTS DOCUMENT: HCPCS | Performed by: FAMILY MEDICINE

## 2023-04-17 NOTE — PROGRESS NOTES
HISTORY OF PRESENT ILLNESS  Ervin Shaver is a 65 y.o. female. HPI   Follow up on c/o irregular heart beat over the past week that has been on her monitor when she has been taking her BP at home. Denies feeling any fluttering or palpitations she has felt. Lightheadedness has gotten better. Has seen cardiology and awaitin to get echo and stress testing done. Overall she thinks the irregular HR has improved some. Cardiovascular Review:  The patient has hypertension and hyperlipidemia. Tolerating Norvasc well. No chest pain and no SOB. Diet and Lifestyle: generally follows a low fat low cholesterol diet, generally follows a low sodium diet, exercises regularly  Home BP Monitoring: Ranging 120-130ss/80s. Pertinent ROS: taking medications as instructed, no TIA's, no chest pain on exertion, no dyspnea on exertion, noting improvement in swelling in swelling. Glucose intolerance reveiw:  She has IGT. A1c level is stable at 5.6%. Diabetic ROS - further diabetic ROS: no polyuria or polydipsia, no chest pain, dyspnea or TIA's, no numbness, tingling or pain in extremities, no unusual visual symptoms. Lab review: orders written for new lab studies as appropriate; see orders. HM:  Mammogram 11/18/2021  Bone density 4/17/2018    Patient Active Problem List   Diagnosis Code    Hypovitaminosis D E55.9    Hyperlipidemia E78.5    Asymptomatic PVCs/APC's I49.3    IGT (impaired glucose tolerance) R73.02    Essential hypertension I10    Encounter for medication monitoring Z51.81    Severe obesity (BMI 35.0-39. 9) with comorbidity (HCC) E66.01       Current Outpatient Medications   Medication Sig Dispense Refill    amLODIPine (NORVASC) 5 mg tablet TAKE 1 TABLET EVERY DAY 90 Tablet 2    guaiFENesin-dextromethorphan SR (HUMIBID DM) 600-30 mg per tablet Take 1 Tablet by mouth every twelve (12) hours as needed for Cough. loratadine (Claritin Liqui-Gel) 10 mg cap Take 10 mg by mouth.  Take 1 tablet daily as needed      promethazine-dextromethorphan (PROMETHAZINE-DM) 6.25-15 mg/5 mL syrup Take 5 mL by mouth every six (6) hours as needed for Cough. 180 mL 1    allopurinoL (ZYLOPRIM) 100 mg tablet Take 1 Tablet by mouth daily. 30 Tablet 5    triamterene-hydroCHLOROthiazide (DYAZIDE) 37.5-25 mg per capsule TAKE 1 CAPSULE EVERY DAY 90 Capsule 3    potassium chloride SR (KLOR-CON 10) 10 mEq tablet TAKE 1 TABLET BY MOUTH 2 TIMES A WEEK 24 Tablet 3    cholecalciferol, vitamin D3, 50 mcg (2,000 unit) tab Take 1 Tablet by mouth daily. ferrous sulfate 325 mg (65 mg iron) tablet Take 1 Tablet by mouth two (2) times a week. Allergies   Allergen Reactions    Lisinopril-Hydrochlorothiazide Shortness of Breath     fatigue  fatigue    Triamterene Swelling and Other (comments)     Diazide is ok. ...just it's generic.          Past Medical History:   Diagnosis Date    Gout     Hypertension          Past Surgical History:   Procedure Laterality Date    HX BREAST BIOPSY Right     benign bx age 41's    HX DILATION AND CURETTAGE      ME COLONOSCOPY FLX DX W/COLLJ SPEC WHEN PFRMD      \"long time ago\"           Family History   Problem Relation Age of Onset    Anemia Mother     Heart Failure Mother     Heart Failure Father     Dementia Father         alzheimers    Cataract Sister     No Known Problems Brother     No Known Problems Brother     No Known Problems Brother     Heart Attack Sister     No Known Problems Brother     No Known Problems Sister        Social History     Tobacco Use    Smoking status: Never    Smokeless tobacco: Never   Substance Use Topics    Alcohol use: Yes     Comment: 1/2 wk on occasion        Lab Results   Component Value Date/Time    WBC 6.8 03/10/2023 08:20 AM    HGB 12.8 03/10/2023 08:20 AM    Hemoglobin (POC) 15.3 12/26/2014 05:11 PM    HCT 43.4 03/10/2023 08:20 AM    Hematocrit (POC) 45 12/26/2014 05:11 PM    PLATELET 421 74/61/3960 08:20 AM    MCV 80.2 03/10/2023 08:20 AM     Lab Results   Component Value Date/Time    Cholesterol, total 181 03/10/2023 08:20 AM    HDL Cholesterol 65 03/10/2023 08:20 AM    LDL, calculated 99.8 03/10/2023 08:20 AM    Triglyceride 81 03/10/2023 08:20 AM    CHOL/HDL Ratio 2.8 03/10/2023 08:20 AM     Lab Results   Component Value Date/Time    TSH 0.82 03/10/2023 08:20 AM    T4, Free 1.14 10/15/2014 09:30 AM      Lab Results   Component Value Date/Time    Sodium 140 03/10/2023 08:20 AM    Potassium 3.9 03/10/2023 08:20 AM    Chloride 100 03/10/2023 08:20 AM    CO2 36 (H) 03/10/2023 08:20 AM    Anion gap 4 (L) 03/10/2023 08:20 AM    Glucose 92 03/10/2023 08:20 AM    BUN 15 03/10/2023 08:20 AM    Creatinine 0.85 03/10/2023 08:20 AM    BUN/Creatinine ratio 18 03/10/2023 08:20 AM    GFR est AA >60 05/27/2022 11:39 AM    GFR est non-AA 58 (L) 05/27/2022 11:39 AM    Calcium 9.6 03/10/2023 08:20 AM    Bilirubin, total 0.3 03/10/2023 08:20 AM    ALT (SGPT) 30 03/10/2023 08:20 AM    Alk. phosphatase 72 03/10/2023 08:20 AM    Protein, total 7.2 03/10/2023 08:20 AM    Albumin 3.6 03/10/2023 08:20 AM    Globulin 3.6 03/10/2023 08:20 AM    A-G Ratio 1.0 (L) 03/10/2023 08:20 AM      Lab Results   Component Value Date/Time    Hemoglobin A1c 6.3 (H) 03/10/2023 08:20 AM    Hemoglobin A1c (POC) 5.6 05/27/2022 01:12 AM         Review of Systems   Constitutional:  Negative for malaise/fatigue. HENT:  Negative for congestion. Eyes:  Negative for blurred vision. Respiratory:  Negative for cough and shortness of breath. Cardiovascular:  Negative for chest pain, palpitations and leg swelling. Gastrointestinal:  Negative for abdominal pain, constipation and heartburn. Genitourinary:  Negative for dysuria, frequency and urgency. Musculoskeletal:  Negative for back pain and joint pain. Neurological:  Negative for dizziness, tingling and headaches. Endo/Heme/Allergies:  Negative for environmental allergies. Psychiatric/Behavioral:  Negative for depression.  The patient does not have insomnia. Physical Exam  Vitals and nursing note reviewed. Constitutional:       Appearance: Normal appearance. She is well-developed. Comments: /78   Pulse 70   Temp 97.9 °F (36.6 °C)   Resp 12   Ht 5' 4\" (1.626 m)   Wt 212 lb 12.8 oz (96.5 kg)   LMP 08/15/1994   SpO2 94%   BMI 36.53 kg/m²    HENT:      Right Ear: Tympanic membrane and ear canal normal.      Left Ear: Tympanic membrane and ear canal normal.   Neck:      Thyroid: No thyromegaly. Cardiovascular:      Rate and Rhythm: Normal rate and regular rhythm. Heart sounds: Normal heart sounds. Pulmonary:      Effort: Pulmonary effort is normal.      Breath sounds: Normal breath sounds. Abdominal:      General: Bowel sounds are normal.      Palpations: Abdomen is soft. There is no mass. Tenderness: There is no abdominal tenderness. Musculoskeletal:         General: Normal range of motion. Cervical back: Normal range of motion and neck supple. Right lower leg: No edema. Left lower leg: No edema. Lymphadenopathy:      Cervical: No cervical adenopathy. Skin:     General: Skin is warm and dry. Neurological:      General: No focal deficit present. Mental Status: She is alert and oriented to person, place, and time. Psychiatric:         Mood and Affect: Mood normal.       ASSESSMENT and PLAN  Diagnoses and all orders for this visit:    1. Essential hypertension  Stable     2. Mixed hyperlipidemia  Continue to monitor. Work on diet and exercise. 3. IGT (impaired glucose tolerance)  Continue to monitor. Work on diet and exercise. 4. Irregular heartbeat//  5. Frequent PVCs  As per cardiology. Follow up after testing is complete. 6. Encounter for medication monitoring      Follow-up and Dispositions    Return in about 3 months (around 7/17/2023).        reviewed diet, exercise and weight control  cardiovascular risk and specific lipid/LDL goals reviewed  reviewed medications and side effects in detail  specific diabetic recommendations: low cholesterol diet, weight control and daily exercise discussed and glycohemoglobin and other lab monitoring discussed    I have discussed diagnosis listed in this note with pt and/or family. I have discussed treatment plans and options and the risk/benefit analysis of those options, including safe use of medications and possible medication side effects. Through the use of shared decision making we have agreed to the above plan. The patient has received an after-visit summary and questions were answered concerning future plans and follow up. Advise pt of any urgent changes then to proceed to the ER.

## 2023-04-17 NOTE — PROGRESS NOTES
Patient identified by 2 identifiers. No chief complaint on file. 1. Have you been to the ER, urgent care clinic since your last visit? Hospitalized since your last visit? No    2. Have you seen or consulted any other health care providers outside of the [address] [place] [address] since your last visit? Include any pap smears or colon screening.  No

## 2023-05-31 RX ORDER — POTASSIUM CHLORIDE 750 MG/1
TABLET, FILM COATED, EXTENDED RELEASE ORAL
Qty: 24 TABLET | Refills: 0 | Status: SHIPPED | OUTPATIENT
Start: 2023-05-31

## 2023-07-03 ENCOUNTER — TELEPHONE (OUTPATIENT)
Age: 78
End: 2023-07-03

## 2023-07-03 NOTE — TELEPHONE ENCOUNTER
Patient would like to see Gisella Oquendo soon regarding (r) foot swollen her call back number is [phone]

## 2023-07-03 NOTE — TELEPHONE ENCOUNTER
Pt going to [facility] and will keep her appointment with Sarah Thomas on the 7/18.

## 2023-07-07 ENCOUNTER — OFFICE VISIT (OUTPATIENT)
Age: 78
End: 2023-07-07

## 2023-07-07 VITALS
DIASTOLIC BLOOD PRESSURE: 75 MMHG | SYSTOLIC BLOOD PRESSURE: 131 MMHG | TEMPERATURE: 97 F | WEIGHT: 213.5 LBS | HEART RATE: 68 BPM | RESPIRATION RATE: 18 BRPM | OXYGEN SATURATION: 97 % | BODY MASS INDEX: 36.65 KG/M2

## 2023-07-07 DIAGNOSIS — M25.571 ACUTE RIGHT ANKLE PAIN: Primary | ICD-10-CM

## 2023-07-07 DIAGNOSIS — M72.2 PLANTAR FASCIITIS OF RIGHT FOOT: ICD-10-CM

## 2023-07-07 PROBLEM — R00.2 PALPITATIONS: Status: ACTIVE | Noted: 2023-04-03

## 2023-07-07 PROBLEM — S30.1XXA CONTUSION OF ABDOMINAL WALL: Status: ACTIVE | Noted: 2022-10-31

## 2023-07-07 PROBLEM — N18.30 CHRONIC RENAL DISEASE, STAGE III (HCC): Status: ACTIVE | Noted: 2022-09-19

## 2023-07-07 RX ORDER — TRIAMCINOLONE ACETONIDE 1 MG/G
CREAM TOPICAL
COMMUNITY
Start: 2022-10-05

## 2023-07-07 NOTE — PROGRESS NOTES
Chief Complaint   Patient presents with    New Patient    Foot Pain     Patient presents for right foot pain lingering for about 1 month with minimal to no relief. The patient states that she has been trying to treat the pain and edema with OTC medications and is getting minimal to no relief. History provided by:  Patient  Foot Pain   Pain location: bottom of rt heel. This is a new problem. The current episode started more than 1 month ago. There has been no history of extremity trauma. The problem occurs constantly. The problem has been gradually improving. The quality of the pain is described as aching. The pain is moderate. Associated symptoms include an inability to bear weight (when it was at its peak - gradually improved). The symptoms are aggravated by standing (walking). She has tried acetaminophen (and methol rub) for the symptoms. The treatment provided significant relief. Her past medical history is significant for gout. There is no history of osteoarthritis or rheumatoid arthritis. Ankle Pain   The incident occurred more than 1 week ago. There was no injury mechanism. The pain is present in the right ankle. The quality of the pain is described as aching. The pain is mild. Associated symptoms include an inability to bear weight (when it was at its peak - gradually improved). Associated symptoms comments: Swelling on ankle with some discomfort with movement. The symptoms are aggravated by weight bearing. She has tried acetaminophen for the symptoms. The treatment provided moderate relief.      Past Medical History:   Diagnosis Date    Gout     Hypertension        Past Surgical History:   Procedure Laterality Date    BREAST BIOPSY Right     benign bx age 41's    COLONOSCOPY FLX DX W/COLLJ SPEC WHEN PFRMD      \"long time ago\"    DILATION AND CURETTAGE OF UTERUS         Social History     Tobacco Use    Smoking status: Never     Passive exposure: Never    Smokeless tobacco: Never   Vaping Use

## 2023-07-08 ENCOUNTER — HOSPITAL ENCOUNTER (INPATIENT)
Facility: HOSPITAL | Age: 78
LOS: 1 days | Discharge: HOME | DRG: 282 | End: 2023-07-09
Attending: EMERGENCY MEDICINE | Admitting: INTERNAL MEDICINE
Payer: MEDICARE

## 2023-07-08 ENCOUNTER — APPOINTMENT (OUTPATIENT)
Facility: HOSPITAL | Age: 78
DRG: 282 | End: 2023-07-08
Payer: MEDICARE

## 2023-07-08 DIAGNOSIS — I21.4 NSTEMI (NON-ST ELEVATED MYOCARDIAL INFARCTION) (HCC): Primary | ICD-10-CM

## 2023-07-08 LAB
ALBUMIN SERPL-MCNC: 3.8 G/DL (ref 3.5–5)
ALBUMIN/GLOB SERPL: 0.9 (ref 1.1–2.2)
ALP SERPL-CCNC: 77 U/L (ref 45–117)
ALT SERPL-CCNC: 31 U/L (ref 12–78)
ANION GAP SERPL CALC-SCNC: 2 MMOL/L (ref 5–15)
AST SERPL-CCNC: 21 U/L (ref 15–37)
BASOPHILS # BLD: 0 K/UL (ref 0–0.1)
BASOPHILS NFR BLD: 1 % (ref 0–1)
BILIRUB SERPL-MCNC: 0.2 MG/DL (ref 0.2–1)
BUN SERPL-MCNC: 11 MG/DL (ref 6–20)
BUN/CREAT SERPL: 13 (ref 12–20)
CALCIUM SERPL-MCNC: 9.4 MG/DL (ref 8.5–10.1)
CHLORIDE SERPL-SCNC: 104 MMOL/L (ref 97–108)
CO2 SERPL-SCNC: 34 MMOL/L (ref 21–32)
CREAT SERPL-MCNC: 0.84 MG/DL (ref 0.55–1.02)
DIFFERENTIAL METHOD BLD: ABNORMAL
EKG ATRIAL RATE: 63 BPM
EKG DIAGNOSIS: NORMAL
EKG P AXIS: 65 DEGREES
EKG P-R INTERVAL: 154 MS
EKG Q-T INTERVAL: 388 MS
EKG QRS DURATION: 74 MS
EKG QTC CALCULATION (BAZETT): 397 MS
EKG R AXIS: 1 DEGREES
EKG T AXIS: 27 DEGREES
EKG VENTRICULAR RATE: 63 BPM
EOSINOPHIL # BLD: 0.1 K/UL (ref 0–0.4)
EOSINOPHIL NFR BLD: 2 % (ref 0–7)
ERYTHROCYTE [DISTWIDTH] IN BLOOD BY AUTOMATED COUNT: 16.4 % (ref 11.5–14.5)
GLOBULIN SER CALC-MCNC: 4.2 G/DL (ref 2–4)
GLUCOSE SERPL-MCNC: 108 MG/DL (ref 65–100)
HCT VFR BLD AUTO: 43.8 % (ref 35–47)
HGB BLD-MCNC: 13.2 G/DL (ref 11.5–16)
IMM GRANULOCYTES # BLD AUTO: 0 K/UL (ref 0–0.04)
IMM GRANULOCYTES NFR BLD AUTO: 0 % (ref 0–0.5)
LYMPHOCYTES # BLD: 1.8 K/UL (ref 0.8–3.5)
LYMPHOCYTES NFR BLD: 32 % (ref 12–49)
MCH RBC QN AUTO: 23.6 PG (ref 26–34)
MCHC RBC AUTO-ENTMCNC: 30.1 G/DL (ref 30–36.5)
MCV RBC AUTO: 78.4 FL (ref 80–99)
MONOCYTES # BLD: 0.4 K/UL (ref 0–1)
MONOCYTES NFR BLD: 6 % (ref 5–13)
NEUTS SEG # BLD: 3.4 K/UL (ref 1.8–8)
NEUTS SEG NFR BLD: 59 % (ref 32–75)
NRBC # BLD: 0 K/UL (ref 0–0.01)
NRBC BLD-RTO: 0 PER 100 WBC
PLATELET # BLD AUTO: 292 K/UL (ref 150–400)
PMV BLD AUTO: 9.3 FL (ref 8.9–12.9)
POTASSIUM SERPL-SCNC: 3.7 MMOL/L (ref 3.5–5.1)
PROT SERPL-MCNC: 8 G/DL (ref 6.4–8.2)
RBC # BLD AUTO: 5.59 M/UL (ref 3.8–5.2)
SODIUM SERPL-SCNC: 140 MMOL/L (ref 136–145)
TROPONIN I SERPL HS-MCNC: 128 NG/L (ref 0–51)
TROPONIN I SERPL HS-MCNC: 137 NG/L (ref 0–51)
TROPONIN I SERPL HS-MCNC: 151 NG/L (ref 0–51)
WBC # BLD AUTO: 5.8 K/UL (ref 3.6–11)

## 2023-07-08 PROCEDURE — 93005 ELECTROCARDIOGRAM TRACING: CPT | Performed by: EMERGENCY MEDICINE

## 2023-07-08 PROCEDURE — 2060000000 HC ICU INTERMEDIATE R&B

## 2023-07-08 PROCEDURE — 84484 ASSAY OF TROPONIN QUANT: CPT

## 2023-07-08 PROCEDURE — 6360000002 HC RX W HCPCS: Performed by: INTERNAL MEDICINE

## 2023-07-08 PROCEDURE — 36415 COLL VENOUS BLD VENIPUNCTURE: CPT

## 2023-07-08 PROCEDURE — 71045 X-RAY EXAM CHEST 1 VIEW: CPT

## 2023-07-08 PROCEDURE — 80053 COMPREHEN METABOLIC PANEL: CPT

## 2023-07-08 PROCEDURE — 6370000000 HC RX 637 (ALT 250 FOR IP): Performed by: EMERGENCY MEDICINE

## 2023-07-08 PROCEDURE — 2580000003 HC RX 258: Performed by: EMERGENCY MEDICINE

## 2023-07-08 PROCEDURE — 2580000003 HC RX 258: Performed by: INTERNAL MEDICINE

## 2023-07-08 PROCEDURE — 85025 COMPLETE CBC W/AUTO DIFF WBC: CPT

## 2023-07-08 PROCEDURE — 99285 EMERGENCY DEPT VISIT HI MDM: CPT

## 2023-07-08 PROCEDURE — 6370000000 HC RX 637 (ALT 250 FOR IP): Performed by: INTERNAL MEDICINE

## 2023-07-08 RX ORDER — SODIUM CHLORIDE 9 MG/ML
INJECTION, SOLUTION INTRAVENOUS PRN
Status: DISCONTINUED | OUTPATIENT
Start: 2023-07-08 | End: 2023-07-09 | Stop reason: HOSPADM

## 2023-07-08 RX ORDER — CETIRIZINE HYDROCHLORIDE 10 MG/1
5 TABLET ORAL DAILY
Status: DISCONTINUED | OUTPATIENT
Start: 2023-07-08 | End: 2023-07-09 | Stop reason: HOSPADM

## 2023-07-08 RX ORDER — FERROUS SULFATE 325(65) MG
325 TABLET ORAL
Status: DISCONTINUED | OUTPATIENT
Start: 2023-07-10 | End: 2023-07-09 | Stop reason: HOSPADM

## 2023-07-08 RX ORDER — ACETAMINOPHEN 650 MG/1
650 SUPPOSITORY RECTAL EVERY 6 HOURS PRN
Status: DISCONTINUED | OUTPATIENT
Start: 2023-07-08 | End: 2023-07-09 | Stop reason: HOSPADM

## 2023-07-08 RX ORDER — ONDANSETRON 4 MG/1
4 TABLET, ORALLY DISINTEGRATING ORAL EVERY 8 HOURS PRN
Status: DISCONTINUED | OUTPATIENT
Start: 2023-07-08 | End: 2023-07-09 | Stop reason: HOSPADM

## 2023-07-08 RX ORDER — ONDANSETRON 2 MG/ML
4 INJECTION INTRAMUSCULAR; INTRAVENOUS EVERY 6 HOURS PRN
Status: DISCONTINUED | OUTPATIENT
Start: 2023-07-08 | End: 2023-07-09 | Stop reason: HOSPADM

## 2023-07-08 RX ORDER — SODIUM CHLORIDE 0.9 % (FLUSH) 0.9 %
5-40 SYRINGE (ML) INJECTION PRN
Status: DISCONTINUED | OUTPATIENT
Start: 2023-07-08 | End: 2023-07-09 | Stop reason: HOSPADM

## 2023-07-08 RX ORDER — ENOXAPARIN SODIUM 100 MG/ML
40 INJECTION SUBCUTANEOUS DAILY
Status: DISCONTINUED | OUTPATIENT
Start: 2023-07-08 | End: 2023-07-08

## 2023-07-08 RX ORDER — ALLOPURINOL 100 MG/1
100 TABLET ORAL DAILY
Status: DISCONTINUED | OUTPATIENT
Start: 2023-07-08 | End: 2023-07-09 | Stop reason: HOSPADM

## 2023-07-08 RX ORDER — POTASSIUM CHLORIDE 750 MG/1
10 TABLET, FILM COATED, EXTENDED RELEASE ORAL DAILY
Status: DISCONTINUED | OUTPATIENT
Start: 2023-07-08 | End: 2023-07-09 | Stop reason: HOSPADM

## 2023-07-08 RX ORDER — ASPIRIN 325 MG
161 TABLET ORAL
Status: COMPLETED | OUTPATIENT
Start: 2023-07-08 | End: 2023-07-08

## 2023-07-08 RX ORDER — POLYETHYLENE GLYCOL 3350 17 G/17G
17 POWDER, FOR SOLUTION ORAL DAILY PRN
Status: DISCONTINUED | OUTPATIENT
Start: 2023-07-08 | End: 2023-07-09 | Stop reason: HOSPADM

## 2023-07-08 RX ORDER — SODIUM CHLORIDE 0.9 % (FLUSH) 0.9 %
5-40 SYRINGE (ML) INJECTION EVERY 12 HOURS SCHEDULED
Status: DISCONTINUED | OUTPATIENT
Start: 2023-07-08 | End: 2023-07-09 | Stop reason: HOSPADM

## 2023-07-08 RX ORDER — ENOXAPARIN SODIUM 100 MG/ML
1 INJECTION SUBCUTANEOUS 2 TIMES DAILY
Status: DISCONTINUED | OUTPATIENT
Start: 2023-07-08 | End: 2023-07-09 | Stop reason: HOSPADM

## 2023-07-08 RX ORDER — AMLODIPINE BESYLATE 5 MG/1
5 TABLET ORAL DAILY
Status: DISCONTINUED | OUTPATIENT
Start: 2023-07-08 | End: 2023-07-09 | Stop reason: HOSPADM

## 2023-07-08 RX ORDER — 0.9 % SODIUM CHLORIDE 0.9 %
500 INTRAVENOUS SOLUTION INTRAVENOUS ONCE
Status: COMPLETED | OUTPATIENT
Start: 2023-07-08 | End: 2023-07-08

## 2023-07-08 RX ORDER — ACETAMINOPHEN 325 MG/1
650 TABLET ORAL EVERY 6 HOURS PRN
Status: DISCONTINUED | OUTPATIENT
Start: 2023-07-08 | End: 2023-07-09 | Stop reason: HOSPADM

## 2023-07-08 RX ADMIN — ALLOPURINOL 100 MG: 100 TABLET ORAL at 16:20

## 2023-07-08 RX ADMIN — ENOXAPARIN SODIUM 100 MG: 100 INJECTION SUBCUTANEOUS at 16:20

## 2023-07-08 RX ADMIN — POTASSIUM CHLORIDE 10 MEQ: 750 TABLET, FILM COATED, EXTENDED RELEASE ORAL at 16:20

## 2023-07-08 RX ADMIN — SODIUM CHLORIDE, PRESERVATIVE FREE 10 ML: 5 INJECTION INTRAVENOUS at 20:23

## 2023-07-08 RX ADMIN — CETIRIZINE HYDROCHLORIDE 5 MG: 10 TABLET, FILM COATED ORAL at 16:20

## 2023-07-08 RX ADMIN — SODIUM CHLORIDE 500 ML: 9 INJECTION, SOLUTION INTRAVENOUS at 12:32

## 2023-07-08 RX ADMIN — ASPIRIN 325 MG ORAL TABLET 161 MG: 325 PILL ORAL at 12:32

## 2023-07-08 RX ADMIN — AMLODIPINE BESYLATE 5 MG: 5 TABLET ORAL at 16:20

## 2023-07-08 ASSESSMENT — PAIN - FUNCTIONAL ASSESSMENT
PAIN_FUNCTIONAL_ASSESSMENT: NONE - DENIES PAIN
PAIN_FUNCTIONAL_ASSESSMENT: 0-10

## 2023-07-08 ASSESSMENT — PAIN DESCRIPTION - ORIENTATION: ORIENTATION: LEFT

## 2023-07-08 ASSESSMENT — PAIN DESCRIPTION - LOCATION: LOCATION: KNEE

## 2023-07-08 ASSESSMENT — PAIN SCALES - GENERAL
PAINLEVEL_OUTOF10: 0
PAINLEVEL_OUTOF10: 4

## 2023-07-08 ASSESSMENT — PAIN DESCRIPTION - DESCRIPTORS: DESCRIPTORS: ACHING

## 2023-07-09 VITALS
DIASTOLIC BLOOD PRESSURE: 74 MMHG | OXYGEN SATURATION: 94 % | BODY MASS INDEX: 36.37 KG/M2 | HEART RATE: 79 BPM | RESPIRATION RATE: 21 BRPM | HEIGHT: 64 IN | SYSTOLIC BLOOD PRESSURE: 120 MMHG | WEIGHT: 213 LBS | TEMPERATURE: 98.4 F

## 2023-07-09 PROCEDURE — 6370000000 HC RX 637 (ALT 250 FOR IP): Performed by: INTERNAL MEDICINE

## 2023-07-09 PROCEDURE — 6360000002 HC RX W HCPCS: Performed by: INTERNAL MEDICINE

## 2023-07-09 PROCEDURE — 2580000003 HC RX 258: Performed by: INTERNAL MEDICINE

## 2023-07-09 RX ADMIN — ENOXAPARIN SODIUM 100 MG: 100 INJECTION SUBCUTANEOUS at 09:09

## 2023-07-09 RX ADMIN — SODIUM CHLORIDE, PRESERVATIVE FREE 10 ML: 5 INJECTION INTRAVENOUS at 09:12

## 2023-07-09 RX ADMIN — CETIRIZINE HYDROCHLORIDE 5 MG: 10 TABLET, FILM COATED ORAL at 09:08

## 2023-07-09 RX ADMIN — POTASSIUM CHLORIDE 10 MEQ: 750 TABLET, FILM COATED, EXTENDED RELEASE ORAL at 09:08

## 2023-07-09 RX ADMIN — ALLOPURINOL 100 MG: 100 TABLET ORAL at 09:08

## 2023-07-09 RX ADMIN — AMLODIPINE BESYLATE 5 MG: 5 TABLET ORAL at 09:08

## 2023-07-09 ASSESSMENT — ENCOUNTER SYMPTOMS
COUGH: 0
ABDOMINAL PAIN: 0
SHORTNESS OF BREATH: 0

## 2023-07-11 NOTE — PROGRESS NOTES
Physician Progress Note      PATIENT:               Chaya Bush  CSN #:                  [id number]  :                       1945  ADMIT DATE:       2023 9:30 AM  [address] [facility] DATE:        2023 4:50 PM  RESPONDING  PROVIDER #:        Pierce Marie MD          QUERY TEXT:    77yoF patient admitted with chest pain. EKG showed no ischemic changes. Cardiology consult noted 'Non specific troponin elevation.'  After study, can   the cardiac condition be further clarified: The medical record reflects the following:  Risk Factors: 77yoF w/ hx HTN, CKD III, elevated troponin  Clinical Indicators: c/o chest pain accompanied with difficulty breathing, no   diaphoresis; EKG normal Sinus rhythm.  Cardiology consult:  Chest pain- brief. Non specific troponin elevation. Stress test normal . H&P note 'Trop 151 = 128 => 137  EKG without ischemic changes  C/w ASA  Treatment: ASA, EKG, Cardiology consult. Options provided:  -- NSTEMI  -- NSTEM ruled out  -- Other - I will add my own diagnosis  -- Disagree - Not applicable / Not valid  -- Disagree - Clinically unable to determine / Unknown  -- Refer to Clinical Documentation Reviewer    PROVIDER RESPONSE TEXT:    This patient has an NSTEMI POA.     Query created by: Kendrick Hartmann on 2023 10:54 AM      Electronically signed by:  Pierce Marie MD 2023 10:55 AM

## 2023-07-16 SDOH — ECONOMIC STABILITY: HOUSING INSECURITY
IN THE LAST 12 MONTHS, WAS THERE A TIME WHEN YOU DID NOT HAVE A STEADY PLACE TO SLEEP OR SLEPT IN A SHELTER (INCLUDING NOW)?: NO

## 2023-07-16 SDOH — ECONOMIC STABILITY: FOOD INSECURITY: WITHIN THE PAST 12 MONTHS, THE FOOD YOU BOUGHT JUST DIDN'T LAST AND YOU DIDN'T HAVE MONEY TO GET MORE.: NEVER TRUE

## 2023-07-16 SDOH — ECONOMIC STABILITY: INCOME INSECURITY: HOW HARD IS IT FOR YOU TO PAY FOR THE VERY BASICS LIKE FOOD, HOUSING, MEDICAL CARE, AND HEATING?: NOT HARD AT ALL

## 2023-07-16 SDOH — ECONOMIC STABILITY: FOOD INSECURITY: WITHIN THE PAST 12 MONTHS, YOU WORRIED THAT YOUR FOOD WOULD RUN OUT BEFORE YOU GOT MONEY TO BUY MORE.: SOMETIMES TRUE

## 2023-07-16 SDOH — ECONOMIC STABILITY: TRANSPORTATION INSECURITY
IN THE PAST 12 MONTHS, HAS LACK OF TRANSPORTATION KEPT YOU FROM MEETINGS, WORK, OR FROM GETTING THINGS NEEDED FOR DAILY LIVING?: NO

## 2023-07-18 ENCOUNTER — OFFICE VISIT (OUTPATIENT)
Age: 78
End: 2023-07-18
Payer: MEDICARE

## 2023-07-18 VITALS
HEIGHT: 64 IN | OXYGEN SATURATION: 97 % | DIASTOLIC BLOOD PRESSURE: 75 MMHG | RESPIRATION RATE: 20 BRPM | TEMPERATURE: 98.3 F | SYSTOLIC BLOOD PRESSURE: 134 MMHG | WEIGHT: 212 LBS | HEART RATE: 71 BPM | BODY MASS INDEX: 36.19 KG/M2

## 2023-07-18 DIAGNOSIS — R73.02 IMPAIRED GLUCOSE TOLERANCE (ORAL): ICD-10-CM

## 2023-07-18 DIAGNOSIS — I10 ESSENTIAL (PRIMARY) HYPERTENSION: ICD-10-CM

## 2023-07-18 DIAGNOSIS — Z12.31 ENCOUNTER FOR SCREENING MAMMOGRAM FOR BREAST CANCER: ICD-10-CM

## 2023-07-18 DIAGNOSIS — Z79.899 ENCOUNTER FOR LONG-TERM (CURRENT) USE OF MEDICATIONS: ICD-10-CM

## 2023-07-18 DIAGNOSIS — E66.9 NON MORBID OBESITY: ICD-10-CM

## 2023-07-18 DIAGNOSIS — Z00.00 MEDICARE ANNUAL WELLNESS VISIT, SUBSEQUENT: Primary | ICD-10-CM

## 2023-07-18 DIAGNOSIS — E78.2 MIXED HYPERLIPIDEMIA: ICD-10-CM

## 2023-07-18 PROCEDURE — G0439 PPPS, SUBSEQ VISIT: HCPCS | Performed by: FAMILY MEDICINE

## 2023-07-18 PROCEDURE — 1124F ACP DISCUSS-NO DSCNMKR DOCD: CPT | Performed by: FAMILY MEDICINE

## 2023-07-18 PROCEDURE — G8427 DOCREV CUR MEDS BY ELIG CLIN: HCPCS | Performed by: FAMILY MEDICINE

## 2023-07-18 PROCEDURE — 1111F DSCHRG MED/CURRENT MED MERGE: CPT | Performed by: FAMILY MEDICINE

## 2023-07-18 PROCEDURE — G8417 CALC BMI ABV UP PARAM F/U: HCPCS | Performed by: FAMILY MEDICINE

## 2023-07-18 PROCEDURE — 1036F TOBACCO NON-USER: CPT | Performed by: FAMILY MEDICINE

## 2023-07-18 PROCEDURE — 3075F SYST BP GE 130 - 139MM HG: CPT | Performed by: FAMILY MEDICINE

## 2023-07-18 PROCEDURE — 99214 OFFICE O/P EST MOD 30 MIN: CPT | Performed by: FAMILY MEDICINE

## 2023-07-18 PROCEDURE — 3078F DIAST BP <80 MM HG: CPT | Performed by: FAMILY MEDICINE

## 2023-07-18 PROCEDURE — G8399 PT W/DXA RESULTS DOCUMENT: HCPCS | Performed by: FAMILY MEDICINE

## 2023-07-18 PROCEDURE — 1090F PRES/ABSN URINE INCON ASSESS: CPT | Performed by: FAMILY MEDICINE

## 2023-07-18 SDOH — ECONOMIC STABILITY: FOOD INSECURITY: WITHIN THE PAST 12 MONTHS, YOU WORRIED THAT YOUR FOOD WOULD RUN OUT BEFORE YOU GOT MONEY TO BUY MORE.: NEVER TRUE

## 2023-07-18 SDOH — ECONOMIC STABILITY: FOOD INSECURITY: WITHIN THE PAST 12 MONTHS, THE FOOD YOU BOUGHT JUST DIDN'T LAST AND YOU DIDN'T HAVE MONEY TO GET MORE.: NEVER TRUE

## 2023-07-18 SDOH — ECONOMIC STABILITY: INCOME INSECURITY: HOW HARD IS IT FOR YOU TO PAY FOR THE VERY BASICS LIKE FOOD, HOUSING, MEDICAL CARE, AND HEATING?: NOT HARD AT ALL

## 2023-07-18 ASSESSMENT — PATIENT HEALTH QUESTIONNAIRE - PHQ9
SUM OF ALL RESPONSES TO PHQ QUESTIONS 1-9: 0
2. FEELING DOWN, DEPRESSED OR HOPELESS: 0
SUM OF ALL RESPONSES TO PHQ QUESTIONS 1-9: 0
1. LITTLE INTEREST OR PLEASURE IN DOING THINGS: 0
SUM OF ALL RESPONSES TO PHQ9 QUESTIONS 1 & 2: 0

## 2023-07-18 ASSESSMENT — ENCOUNTER SYMPTOMS
COUGH: 0
BLOOD IN STOOL: 0
CONSTIPATION: 0
CHEST TIGHTNESS: 0
RHINORRHEA: 0
ABDOMINAL PAIN: 0
SHORTNESS OF BREATH: 0

## 2023-07-18 ASSESSMENT — LIFESTYLE VARIABLES
HOW OFTEN DO YOU HAVE A DRINK CONTAINING ALCOHOL: MONTHLY OR LESS
HOW MANY STANDARD DRINKS CONTAINING ALCOHOL DO YOU HAVE ON A TYPICAL DAY: 1 OR 2

## 2023-07-18 NOTE — PATIENT INSTRUCTIONS
Ad Summos, [facility] disclaims any warranty or liability for your use of this information. Personalized Preventive Plan for Александр Gorman - 7/18/2023  Medicare offers a range of preventive health benefits. Some of the tests and screenings are paid in full while other may be subject to a deductible, co-insurance, and/or copay. Some of these benefits include a comprehensive review of your medical history including lifestyle, illnesses that may run in your family, and various assessments and screenings as appropriate. After reviewing your medical record and screening and assessments performed today your provider may have ordered immunizations, labs, imaging, and/or referrals for you. A list of these orders (if applicable) as well as your Preventive Care list are included within your After Visit Summary for your review. Other Preventive Recommendations:    A preventive eye exam performed by an eye specialist is recommended every 1-2 years to screen for glaucoma; cataracts, macular degeneration, and other eye disorders. A preventive dental visit is recommended every 6 months. Try to get at least 150 minutes of exercise per week or 10,000 steps per day on a pedometer . Order or download the FREE \"Exercise & Physical Activity: Your Everyday Guide\" from [employer]. Call 0-690.368.2625 or search [employer] online. You need 9715-1892 mg of calcium and 0608-5312 IU of vitamin D per day. It is possible to meet your calcium requirement with diet alone, but a vitamin D supplement is usually necessary to meet this goal.  When exposed to the sun, use a sunscreen that protects against both UVA and UVB radiation with an SPF of 30 or greater. Reapply every 2 to 3 hours or after sweating, drying off with a towel, or swimming. Always wear a seat belt when traveling in a car. Always wear a helmet when riding a bicycle or motorcycle.

## 2023-07-28 ENCOUNTER — TELEPHONE (OUTPATIENT)
Age: 78
End: 2023-07-28

## 2023-07-28 ENCOUNTER — OFFICE VISIT (OUTPATIENT)
Age: 78
End: 2023-07-28

## 2023-07-28 VITALS
OXYGEN SATURATION: 100 % | WEIGHT: 212 LBS | HEART RATE: 60 BPM | RESPIRATION RATE: 16 BRPM | TEMPERATURE: 98.8 F | BODY MASS INDEX: 36.19 KG/M2 | HEIGHT: 64 IN | DIASTOLIC BLOOD PRESSURE: 72 MMHG | SYSTOLIC BLOOD PRESSURE: 131 MMHG

## 2023-07-28 DIAGNOSIS — M25.472 EDEMA OF LEFT ANKLE: Primary | ICD-10-CM

## 2023-07-28 DIAGNOSIS — M10.9 GOUT OF FOOT, UNSPECIFIED CAUSE, UNSPECIFIED CHRONICITY, UNSPECIFIED LATERALITY: ICD-10-CM

## 2023-07-28 RX ORDER — ALLOPURINOL 100 MG/1
100 TABLET ORAL DAILY
COMMUNITY

## 2023-07-28 NOTE — PROGRESS NOTES
Chief Complaint   Patient presents with    Foot Pain     Pt here today with complaint of left foot pain. Reports history of gout, had an episode in right great toe last week. Reports developed left foot pain about 3-4 day ago, unsure if related to the gout. Denies any trauma or injury. Pain is throughout her whole foot and ankle with some associated swelling. Taking Acetaminophen for pain relief. Foot Pain   The pain is present in the left foot and left ankle. This is a new problem. The current episode started in the past 7 days. There has been no history of extremity trauma. The problem occurs constantly. The problem has been unchanged. The quality of the pain is described as aching. The pain is at a severity of 5/10. The pain is moderate. Associated symptoms include joint swelling and stiffness. Pertinent negatives include no inability to bear weight, numbness or tingling. Associated symptoms comments: Left ankle edema - - mild soreness-   Also pain on foot 0= with difficulty putting weight on it   Had gout on rt foot few weeks before  No injury- denies excess walking . The symptoms are aggravated by standing. She has tried nothing for the symptoms. Her past medical history is significant for gout and osteoarthritis.      Past Medical History:   Diagnosis Date    Gout     Hypertension        Past Surgical History:   Procedure Laterality Date    BREAST BIOPSY Right     benign bx age 41's    COLONOSCOPY FLX DX W/COLLJ SPEC WHEN PFRMD      \"long time ago\"    DILATION AND CURETTAGE OF UTERUS         Social History     Tobacco Use    Smoking status: Never     Passive exposure: Never    Smokeless tobacco: Never   Vaping Use    Vaping Use: Never used   Substance Use Topics    Alcohol use: Yes     Comment: occ    Drug use: Never        Allergies   Allergen Reactions    Lisinopril-Hydrochlorothiazide Shortness Of Breath     fatigue  fatigue  fatigue    Triamterene Other (See Comments) and Swelling     Diazide is

## 2023-07-28 NOTE — TELEPHONE ENCOUNTER
Patient called stating she has gout in her left foot and it is swollen and is going to urgent care.   If need to speak with please call [phone]

## 2023-07-28 NOTE — PATIENT INSTRUCTIONS
Watch salt intake  Keep foot / leg elevated    Amlodipine can cause edema of ankle and foot  May use mild compression stockings

## 2023-08-08 RX ORDER — TRIAMTERENE AND HYDROCHLOROTHIAZIDE 37.5; 25 MG/1; MG/1
CAPSULE ORAL
Qty: 90 CAPSULE | Refills: 3 | Status: SHIPPED | OUTPATIENT
Start: 2023-08-08

## 2023-08-08 NOTE — TELEPHONE ENCOUNTER
Last appointment: 7/18/23  Next appointment: 10/20/23  Previous refill encounter(s): 7/29/22 #90 with 3 refills    Requested Prescriptions     Pending Prescriptions Disp Refills    triamterene-hydroCHLOROthiazide (DYAZIDE) 37.5-25 MG per capsule [Pharmacy Med Name: TRIAMTERENE/HYDROCHLOROTHIAZIDE 37.5-25 MG Capsule] 90 capsule 3     Sig: TAKE 1 [address] Only    Program: Medication Refill  CPA in place:    Recommendation Provided To:    Intervention Detail: New Rx: 1, reason: Patient Preference  Intervention Accepted By:   Alia Pastor Closed?:    Time Spent (min): 5

## 2023-08-15 ENCOUNTER — OFFICE VISIT (OUTPATIENT)
Age: 78
End: 2023-08-15

## 2023-08-15 VITALS
TEMPERATURE: 98.2 F | HEART RATE: 65 BPM | BODY MASS INDEX: 36.57 KG/M2 | DIASTOLIC BLOOD PRESSURE: 73 MMHG | HEIGHT: 64 IN | RESPIRATION RATE: 16 BRPM | SYSTOLIC BLOOD PRESSURE: 147 MMHG | WEIGHT: 214.2 LBS | OXYGEN SATURATION: 96 %

## 2023-08-15 DIAGNOSIS — L84 CORN OR CALLUS: Primary | ICD-10-CM

## 2023-08-16 ENCOUNTER — HOSPITAL ENCOUNTER (OUTPATIENT)
Facility: HOSPITAL | Age: 78
Discharge: HOME | End: 2023-08-19
Attending: FAMILY MEDICINE
Payer: MEDICARE

## 2023-08-16 VITALS — WEIGHT: 214 LBS | HEIGHT: 64 IN | BODY MASS INDEX: 36.54 KG/M2

## 2023-08-16 DIAGNOSIS — Z12.31 ENCOUNTER FOR SCREENING MAMMOGRAM FOR BREAST CANCER: ICD-10-CM

## 2023-08-16 PROCEDURE — 77063 BREAST TOMOSYNTHESIS BI: CPT

## 2023-08-29 RX ORDER — ALLOPURINOL 100 MG/1
TABLET ORAL
Qty: 90 TABLET | Refills: 3 | Status: SHIPPED | OUTPATIENT
Start: 2023-08-29

## 2023-08-29 NOTE — TELEPHONE ENCOUNTER
Last appointment: 7/18/23  Next appointment: 10/20/23  Previous refill encounter(s): 8/22/22 #30 with 5 refills    Requested Prescriptions     Pending Prescriptions Disp Refills    allopurinol (ZYLOPRIM) 100 MG tablet [Pharmacy Med Name: ALLOPURINOL 100 MG TABLET] 90 tablet 3     Sig: TAKE 1 TABLET BY MOUTH [id number] Tracking Only    Program: Medication Refill  CPA in place:    Recommendation Provided To:    Intervention Detail: New Rx: 1, reason: Patient Preference  Intervention Accepted By:   Dee Barth Closed?:    Time Spent (min): 5

## 2023-09-27 RX ORDER — ALLOPURINOL 100 MG/1
100 TABLET ORAL DAILY
Qty: 90 TABLET | Refills: 3 | Status: SHIPPED | OUTPATIENT
Start: 2023-09-27

## 2023-09-27 NOTE — TELEPHONE ENCOUNTER
Alexandrea Walker is requesting a 90 day supply  Previous Rx was sent to [facility]    Last appointment: 7/18/23  Next appointment: 10/20/23    Requested Prescriptions     Pending Prescriptions Disp Refills    allopurinol (ZYLOPRIM) 100 MG tablet 90 tablet 3     Sig: Take 1 tablet by mouth daily         For Pharmacy Admin Tracking Only    Program: Medication Refill  CPA in place:    Recommendation Provided To:    Intervention Detail: New Rx: 1, reason: Patient Preference  Intervention Accepted By:   Latasha Prakash Closed?:    Time Spent (min): 5

## 2023-10-20 ENCOUNTER — OFFICE VISIT (OUTPATIENT)
Age: 78
End: 2023-10-20
Payer: MEDICARE

## 2023-10-20 VITALS
BODY MASS INDEX: 35.72 KG/M2 | RESPIRATION RATE: 18 BRPM | OXYGEN SATURATION: 95 % | WEIGHT: 214.4 LBS | HEIGHT: 65 IN | DIASTOLIC BLOOD PRESSURE: 67 MMHG | HEART RATE: 67 BPM | SYSTOLIC BLOOD PRESSURE: 134 MMHG | TEMPERATURE: 98.8 F

## 2023-10-20 DIAGNOSIS — R73.02 IMPAIRED GLUCOSE TOLERANCE (ORAL): ICD-10-CM

## 2023-10-20 DIAGNOSIS — Z79.899 ENCOUNTER FOR LONG-TERM (CURRENT) USE OF MEDICATIONS: ICD-10-CM

## 2023-10-20 DIAGNOSIS — E66.9 NON MORBID OBESITY: ICD-10-CM

## 2023-10-20 DIAGNOSIS — I10 ESSENTIAL (PRIMARY) HYPERTENSION: Primary | ICD-10-CM

## 2023-10-20 DIAGNOSIS — E78.2 MIXED HYPERLIPIDEMIA: ICD-10-CM

## 2023-10-20 PROBLEM — N18.30 CHRONIC RENAL DISEASE, STAGE III (HCC): Status: RESOLVED | Noted: 2022-09-19 | Resolved: 2023-10-20

## 2023-10-20 LAB
ALBUMIN SERPL-MCNC: 3.8 G/DL (ref 3.5–5)
ALBUMIN/GLOB SERPL: 1.1 (ref 1.1–2.2)
ALP SERPL-CCNC: 81 U/L (ref 45–117)
ALT SERPL-CCNC: 35 U/L (ref 12–78)
ANION GAP SERPL CALC-SCNC: 3 MMOL/L (ref 5–15)
AST SERPL-CCNC: 20 U/L (ref 15–37)
BILIRUB SERPL-MCNC: 0.4 MG/DL (ref 0.2–1)
BUN SERPL-MCNC: 11 MG/DL (ref 6–20)
BUN/CREAT SERPL: 13 (ref 12–20)
CALCIUM SERPL-MCNC: 9.5 MG/DL (ref 8.5–10.1)
CHLORIDE SERPL-SCNC: 101 MMOL/L (ref 97–108)
CHOLEST SERPL-MCNC: 182 MG/DL
CO2 SERPL-SCNC: 36 MMOL/L (ref 21–32)
CREAT SERPL-MCNC: 0.83 MG/DL (ref 0.55–1.02)
GLOBULIN SER CALC-MCNC: 3.6 G/DL (ref 2–4)
GLUCOSE SERPL-MCNC: 100 MG/DL (ref 65–100)
GLUCOSE, POC: 104 MG/DL
HBA1C MFR BLD: 5.8 %
HDLC SERPL-MCNC: 70 MG/DL
HDLC SERPL: 2.6 (ref 0–5)
LDLC SERPL CALC-MCNC: 97.6 MG/DL (ref 0–100)
POTASSIUM SERPL-SCNC: 3.7 MMOL/L (ref 3.5–5.1)
PROT SERPL-MCNC: 7.4 G/DL (ref 6.4–8.2)
SODIUM SERPL-SCNC: 140 MMOL/L (ref 136–145)
TRIGL SERPL-MCNC: 72 MG/DL
VLDLC SERPL CALC-MCNC: 14.4 MG/DL

## 2023-10-20 PROCEDURE — 82962 GLUCOSE BLOOD TEST: CPT | Performed by: FAMILY MEDICINE

## 2023-10-20 PROCEDURE — 83036 HEMOGLOBIN GLYCOSYLATED A1C: CPT | Performed by: FAMILY MEDICINE

## 2023-10-20 PROCEDURE — 99214 OFFICE O/P EST MOD 30 MIN: CPT | Performed by: FAMILY MEDICINE

## 2023-10-20 PROCEDURE — 90694 VACC AIIV4 NO PRSRV 0.5ML IM: CPT | Performed by: FAMILY MEDICINE

## 2023-10-20 RX ORDER — ACETAMINOPHEN 160 MG
50 TABLET,DISINTEGRATING ORAL DAILY
COMMUNITY

## 2023-10-20 RX ORDER — BIOTIN 10 MG
TABLET ORAL
COMMUNITY

## 2023-10-20 SDOH — ECONOMIC STABILITY: FOOD INSECURITY: WITHIN THE PAST 12 MONTHS, THE FOOD YOU BOUGHT JUST DIDN'T LAST AND YOU DIDN'T HAVE MONEY TO GET MORE.: NEVER TRUE

## 2023-10-20 SDOH — ECONOMIC STABILITY: INCOME INSECURITY: HOW HARD IS IT FOR YOU TO PAY FOR THE VERY BASICS LIKE FOOD, HOUSING, MEDICAL CARE, AND HEATING?: SOMEWHAT HARD

## 2023-10-20 SDOH — ECONOMIC STABILITY: FOOD INSECURITY: WITHIN THE PAST 12 MONTHS, YOU WORRIED THAT YOUR FOOD WOULD RUN OUT BEFORE YOU GOT MONEY TO BUY MORE.: NEVER TRUE

## 2023-10-20 ASSESSMENT — ENCOUNTER SYMPTOMS
COUGH: 0
CONSTIPATION: 0
RHINORRHEA: 0
BLOOD IN STOOL: 0
ABDOMINAL PAIN: 0
SHORTNESS OF BREATH: 0
CHEST TIGHTNESS: 0

## 2023-10-20 ASSESSMENT — PATIENT HEALTH QUESTIONNAIRE - PHQ9
1. LITTLE INTEREST OR PLEASURE IN DOING THINGS: 0
SUM OF ALL RESPONSES TO PHQ QUESTIONS 1-9: 0
SUM OF ALL RESPONSES TO PHQ9 QUESTIONS 1 & 2: 0
SUM OF ALL RESPONSES TO PHQ QUESTIONS 1-9: 0
2. FEELING DOWN, DEPRESSED OR HOPELESS: 0
SUM OF ALL RESPONSES TO PHQ QUESTIONS 1-9: 0
SUM OF ALL RESPONSES TO PHQ QUESTIONS 1-9: 0

## 2023-10-20 ASSESSMENT — ANXIETY QUESTIONNAIRES
GAD7 TOTAL SCORE: 0
IF YOU CHECKED OFF ANY PROBLEMS ON THIS QUESTIONNAIRE, HOW DIFFICULT HAVE THESE PROBLEMS MADE IT FOR YOU TO DO YOUR WORK, TAKE CARE OF THINGS AT HOME, OR GET ALONG WITH OTHER PEOPLE: NOT DIFFICULT AT ALL
2. NOT BEING ABLE TO STOP OR CONTROL WORRYING: 0
7. FEELING AFRAID AS IF SOMETHING AWFUL MIGHT HAPPEN: 0
3. WORRYING TOO MUCH ABOUT DIFFERENT THINGS: 0
5. BEING SO RESTLESS THAT IT IS HARD TO SIT STILL: 0
4. TROUBLE RELAXING: 0
6. BECOMING EASILY ANNOYED OR IRRITABLE: 0
1. FEELING NERVOUS, ANXIOUS, OR ON EDGE: 0
GAD7 TOTAL SCORE: 0

## 2023-10-20 NOTE — PROGRESS NOTES
Chief Complaint   Patient presents with    Follow-up     Here for routine follow up. 1. Have you been to the ER, urgent care clinic since your last visit? Hospitalized since your last visit? No    2. Have you seen or consulted any other health care providers outside of the [address] [place] [address] Avenue since your last visit? Include any pap smears or colon screening.  No
concerning future plans and follow up. Advise pt of any urgent changes then to proceed to the ER.             Dexter Cortez MD

## 2023-12-11 ENCOUNTER — TELEPHONE (OUTPATIENT)
Age: 78
End: 2023-12-11

## 2023-12-11 NOTE — TELEPHONE ENCOUNTER
Patient would like to come in to get her right ear cleaned out, she said it is clogged up. Please give her a call @ [phone]  --------------------------------------------------------------------------------------------------------    Can he pt be seen tomorrow, 12/12/23 at 3:40 pm to get her R ear cleaned?

## 2023-12-11 NOTE — TELEPHONE ENCOUNTER
Patient would like to come in to get her right ear cleaned out, she said it is clogged up.   Please give her a call @ [phone]

## 2023-12-12 NOTE — TELEPHONE ENCOUNTER
Spoke to the pt and she stated she will try to make it because she has court that morning. She stated to make the appointment anyway. Pt scheduled per Dr. Kalyani Loredo.

## 2023-12-15 ENCOUNTER — OFFICE VISIT (OUTPATIENT)
Age: 78
End: 2023-12-15
Payer: MEDICARE

## 2023-12-15 VITALS
OXYGEN SATURATION: 100 % | HEIGHT: 65 IN | WEIGHT: 217.6 LBS | TEMPERATURE: 97.8 F | BODY MASS INDEX: 36.25 KG/M2 | HEART RATE: 62 BPM | RESPIRATION RATE: 20 BRPM | DIASTOLIC BLOOD PRESSURE: 74 MMHG | SYSTOLIC BLOOD PRESSURE: 123 MMHG

## 2023-12-15 DIAGNOSIS — H61.23 CERUMEN DEBRIS ON TYMPANIC MEMBRANE, BILATERAL: Primary | ICD-10-CM

## 2023-12-15 PROCEDURE — 1036F TOBACCO NON-USER: CPT | Performed by: FAMILY MEDICINE

## 2023-12-15 PROCEDURE — G8427 DOCREV CUR MEDS BY ELIG CLIN: HCPCS | Performed by: FAMILY MEDICINE

## 2023-12-15 PROCEDURE — G8484 FLU IMMUNIZE NO ADMIN: HCPCS | Performed by: FAMILY MEDICINE

## 2023-12-15 PROCEDURE — 1090F PRES/ABSN URINE INCON ASSESS: CPT | Performed by: FAMILY MEDICINE

## 2023-12-15 PROCEDURE — G8399 PT W/DXA RESULTS DOCUMENT: HCPCS | Performed by: FAMILY MEDICINE

## 2023-12-15 PROCEDURE — 3074F SYST BP LT 130 MM HG: CPT | Performed by: FAMILY MEDICINE

## 2023-12-15 PROCEDURE — 99212 OFFICE O/P EST SF 10 MIN: CPT | Performed by: FAMILY MEDICINE

## 2023-12-15 PROCEDURE — 3078F DIAST BP <80 MM HG: CPT | Performed by: FAMILY MEDICINE

## 2023-12-15 PROCEDURE — G8417 CALC BMI ABV UP PARAM F/U: HCPCS | Performed by: FAMILY MEDICINE

## 2023-12-15 PROCEDURE — 1124F ACP DISCUSS-NO DSCNMKR DOCD: CPT | Performed by: FAMILY MEDICINE

## 2023-12-15 SDOH — ECONOMIC STABILITY: INCOME INSECURITY: HOW HARD IS IT FOR YOU TO PAY FOR THE VERY BASICS LIKE FOOD, HOUSING, MEDICAL CARE, AND HEATING?: NOT HARD AT ALL

## 2023-12-15 SDOH — ECONOMIC STABILITY: FOOD INSECURITY: WITHIN THE PAST 12 MONTHS, YOU WORRIED THAT YOUR FOOD WOULD RUN OUT BEFORE YOU GOT MONEY TO BUY MORE.: NEVER TRUE

## 2023-12-15 SDOH — ECONOMIC STABILITY: FOOD INSECURITY: WITHIN THE PAST 12 MONTHS, THE FOOD YOU BOUGHT JUST DIDN'T LAST AND YOU DIDN'T HAVE MONEY TO GET MORE.: NEVER TRUE

## 2023-12-15 ASSESSMENT — PATIENT HEALTH QUESTIONNAIRE - PHQ9
1. LITTLE INTEREST OR PLEASURE IN DOING THINGS: 0
SUM OF ALL RESPONSES TO PHQ QUESTIONS 1-9: 0
SUM OF ALL RESPONSES TO PHQ9 QUESTIONS 1 & 2: 0
SUM OF ALL RESPONSES TO PHQ QUESTIONS 1-9: 0
SUM OF ALL RESPONSES TO PHQ QUESTIONS 1-9: 0
2. FEELING DOWN, DEPRESSED OR HOPELESS: 0
SUM OF ALL RESPONSES TO PHQ QUESTIONS 1-9: 0

## 2023-12-15 NOTE — PROGRESS NOTES
Kenton Hernández  Subjective:      Rekha Novoa is a 77 y.o. female who presents for evaluation of a plugged ear. She noticed the symptoms in the right ear, 2 week ago. Felicity Hidalgo denies ear pain. Patient Active Problem List   Diagnosis    Hypovitaminosis D    Severe obesity (BMI 35.0-39. 9) with comorbidity ([address])    Encounter for medication monitoring    Mixed hyperlipidemia    IGT (impaired glucose tolerance)    Asymptomatic PVCs    Essential hypertension    Contusion of abdominal wall    Palpitations    Acute non-Q wave non-ST elevation myocardial infarction (NSTEMI) ([facility])    NSTEMI (non-ST elevated myocardial infarction) [facility])       Current Outpatient Medications   Medication Sig Dispense Refill    potassium chloride (KLOR-CON) 10 MEQ extended release tablet Take 1 tablet by mouth Every Monday, Wednesday, and Friday 26 tablet 3    Multiple Vitamins-Minerals (MULTIVITAMIN ADULT EXTRA C) CHEW Take by mouth      Cholecalciferol (VITAMIN D3) 50 MCG (2000 UT) CAPS Take 50 capsules by mouth daily      allopurinol (ZYLOPRIM) 100 MG tablet Take 1 tablet by mouth daily 90 tablet 3    triamterene-hydroCHLOROthiazide (DYAZIDE) 37.5-25 MG per capsule TAKE 1 CAPSULE EVERY DAY 90 capsule 3    amLODIPine (NORVASC) 5 MG tablet TAKE 1 TABLET EVERY DAY      ferrous sulfate (IRON 325) 325 (65 Fe) MG tablet Take 1 tablet by mouth Every Monday, Wednesday, and Friday      promethazine-dextromethorphan (PROMETHAZINE-DM) 6.25-15 MG/5ML syrup Take by mouth every 6 hours as needed (Patient not taking: Reported on 12/15/2023)       No current facility-administered medications for this visit. Allergies   Allergen Reactions    Lisinopril-Hydrochlorothiazide Shortness Of Breath     fatigue  fatigue  fatigue    Triamterene Other (See Comments) and Swelling     Diazide is ok. ...just it's generic.          Past Medical History:   Diagnosis Date    Atrial fibrillation ([address]) 3/2/2023    Lost 2 very close cousins same wk    Gout     Hypertension

## 2024-02-20 PROBLEM — I25.2 HISTORY OF ACUTE MYOCARDIAL INFARCTION: Status: ACTIVE | Noted: 2023-07-08

## 2024-02-20 PROBLEM — I21.4 NSTEMI (NON-ST ELEVATED MYOCARDIAL INFARCTION) (HCC): Status: RESOLVED | Noted: 2023-07-08 | Resolved: 2024-02-20

## 2024-03-22 ENCOUNTER — OFFICE VISIT (OUTPATIENT)
Age: 79
End: 2024-03-22
Payer: MEDICARE

## 2024-03-22 VITALS
RESPIRATION RATE: 18 BRPM | SYSTOLIC BLOOD PRESSURE: 119 MMHG | HEIGHT: 65 IN | WEIGHT: 216.6 LBS | TEMPERATURE: 97.7 F | DIASTOLIC BLOOD PRESSURE: 68 MMHG | BODY MASS INDEX: 36.09 KG/M2 | OXYGEN SATURATION: 98 % | HEART RATE: 73 BPM

## 2024-03-22 DIAGNOSIS — Z79.899 ENCOUNTER FOR LONG-TERM (CURRENT) USE OF MEDICATIONS: ICD-10-CM

## 2024-03-22 DIAGNOSIS — I10 ESSENTIAL (PRIMARY) HYPERTENSION: Primary | ICD-10-CM

## 2024-03-22 DIAGNOSIS — E78.2 MIXED HYPERLIPIDEMIA: ICD-10-CM

## 2024-03-22 DIAGNOSIS — R73.02 IMPAIRED GLUCOSE TOLERANCE (ORAL): ICD-10-CM

## 2024-03-22 DIAGNOSIS — E66.9 NON MORBID OBESITY: ICD-10-CM

## 2024-03-22 PROCEDURE — 3074F SYST BP LT 130 MM HG: CPT | Performed by: FAMILY MEDICINE

## 2024-03-22 PROCEDURE — 1036F TOBACCO NON-USER: CPT | Performed by: FAMILY MEDICINE

## 2024-03-22 PROCEDURE — 3078F DIAST BP <80 MM HG: CPT | Performed by: FAMILY MEDICINE

## 2024-03-22 PROCEDURE — 1090F PRES/ABSN URINE INCON ASSESS: CPT | Performed by: FAMILY MEDICINE

## 2024-03-22 PROCEDURE — G8417 CALC BMI ABV UP PARAM F/U: HCPCS | Performed by: FAMILY MEDICINE

## 2024-03-22 PROCEDURE — 99214 OFFICE O/P EST MOD 30 MIN: CPT | Performed by: FAMILY MEDICINE

## 2024-03-22 PROCEDURE — G8399 PT W/DXA RESULTS DOCUMENT: HCPCS | Performed by: FAMILY MEDICINE

## 2024-03-22 PROCEDURE — G8427 DOCREV CUR MEDS BY ELIG CLIN: HCPCS | Performed by: FAMILY MEDICINE

## 2024-03-22 PROCEDURE — G8484 FLU IMMUNIZE NO ADMIN: HCPCS | Performed by: FAMILY MEDICINE

## 2024-03-22 PROCEDURE — 1124F ACP DISCUSS-NO DSCNMKR DOCD: CPT | Performed by: FAMILY MEDICINE

## 2024-03-22 RX ORDER — ASPIRIN 81 MG/1
81 TABLET ORAL DAILY
COMMUNITY

## 2024-03-22 RX ORDER — ACETAMINOPHEN 500 MG
500 TABLET ORAL EVERY 6 HOURS PRN
COMMUNITY

## 2024-03-22 RX ORDER — DEXTROMETHORPHAN HYDROBROMIDE AND PROMETHAZINE HYDROCHLORIDE 15; 6.25 MG/5ML; MG/5ML
SYRUP ORAL 4 TIMES DAILY PRN
COMMUNITY

## 2024-03-22 ASSESSMENT — PATIENT HEALTH QUESTIONNAIRE - PHQ9
SUM OF ALL RESPONSES TO PHQ9 QUESTIONS 1 & 2: 0
2. FEELING DOWN, DEPRESSED OR HOPELESS: NOT AT ALL
SUM OF ALL RESPONSES TO PHQ QUESTIONS 1-9: 0
1. LITTLE INTEREST OR PLEASURE IN DOING THINGS: NOT AT ALL
SUM OF ALL RESPONSES TO PHQ QUESTIONS 1-9: 0

## 2024-03-22 ASSESSMENT — ENCOUNTER SYMPTOMS
SHORTNESS OF BREATH: 0
ABDOMINAL PAIN: 0
CHEST TIGHTNESS: 0
BLOOD IN STOOL: 0
RHINORRHEA: 0
COUGH: 0

## 2024-03-22 NOTE — PROGRESS NOTES
Chief Complaint   Patient presents with    Follow-up     5 month       \"Have you been to the ER, urgent care clinic since your last visit?  Hospitalized since your last visit?\"    NO    “Have you seen or consulted any other health care providers outside of [facility] since your last visit?”    Yes, For PT for right thumb.             Vitals:    24 0841   BP: 119/68   Pulse: 73   Resp: 18   Temp: 97.7 °F (36.5 °C)   SpO2: 98%       Health Maintenance Due   Topic Date Due    Respiratory Syncytial Virus (RSV) Pregnant or age 60 yrs+ (1 - 1-dose 60+ series) Never done    COVID-19 Vaccine ( season) 2023    Annual Wellness Visit (Medicare Advantage)  2024        The patient, Cora Solis, identity was verified by name and .   
visit:    Essential (primary) hypertension  Discussed sodium restriction, high k rich diet, maintaining ideal body weight and regular exercise program such as daily walking 30 min perday 4-5 times per week, as physiologic means to achieve blood pressure control.  Medication compliance advised.     Mixed hyperlipidemia  Continue to monitor.  Work on diet and exercise.    Impaired glucose tolerance (oral)  Continue to monitor.  Work on diet and exercise.    Encounter for long-term (current) use of medications    Non morbid obesity  I have reviewed/discussed the above normal BMI with the patient.  I have recommended the following interventions: dietary management education, guidance, and counseling .         Return in about 5 months (around 8/22/2024) for medicare [employer].  reviewed diet, exercise and weight control  cardiovascular risk and specific lipid/LDL goals reviewed  reviewed medications and side effects in detail  specific diabetic recommendations: low cholesterol diet, weight control and daily exercise discussed and glycohemoglobin and other lab monitoring discussed      I have discussed diagnosis listed in this note with pt and/or family. I have discussed treatment plans and options and the risk/benefit analysis of those options, including safe use of medications and possible medication side effects.   Through the use of shared decision making we have agreed to the above plan. The patient has received an after-visit summary and questions were answered concerning future plans and follow up.  Advise pt of any urgent changes then to proceed to the ER.            Valentine Arellano MD

## 2024-07-01 RX ORDER — DEXTROMETHORPHAN HYDROBROMIDE AND PROMETHAZINE HYDROCHLORIDE 15; 6.25 MG/5ML; MG/5ML
5 SYRUP ORAL EVERY 6 HOURS PRN
Qty: 180 ML | Refills: 0 | Status: SHIPPED | OUTPATIENT
Start: 2024-07-01

## 2024-07-17 RX ORDER — ALLOPURINOL 100 MG/1
100 TABLET ORAL DAILY
Qty: 90 TABLET | Refills: 3 | Status: SHIPPED | OUTPATIENT
Start: 2024-07-17

## 2024-07-17 NOTE — TELEPHONE ENCOUNTER
Last appointment: 3/22/24  Next appointment: 8/23/24  Previous refill encounter(s): 9/27/23 #90 with 3 refills    Requested Prescriptions     Pending Prescriptions Disp Refills    allopurinol (ZYLOPRIM) 100 MG tablet [Pharmacy Med Name: ALLOPURINOL 100 MG Tablet] 90 tablet 3     Sig: TAKE 1 TABLET EVERY DAY         For Pharmacy Admin Tracking Only    Program: Medication Refill  CPA in place:    Recommendation Provided To:   Intervention Detail: New Rx: 1, reason: Patient Preference  Intervention Accepted By:   Gap Closed?:    Time Spent (min): 5

## 2024-08-22 ENCOUNTER — CLINICAL DOCUMENTATION (OUTPATIENT)
Age: 79
End: 2024-08-22

## 2024-08-22 NOTE — PROGRESS NOTES
Nurse was notified by PSR that patient came in to office yesterday to inform Dr. Suarez that she may be more than 15 minutes late and wanted to make sure she could still be seen.      Spoke with Dr. Suarez and she stated if she was unable to make it on time then she would have to reschedule.    Called patient and informed her of this.  Patient stated \"that is not what I said.  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  She stated \"like I told the  yesterday I will be there between 905 and 910.  That's why I stood an hour in line to let you all know.\"    I stated that was fine and we would see her tomorrow.    She stated \"thank you, I am putting a big sign on my bus right now to remind me to head straight to you alls office after I drop off the kids\"

## 2024-08-23 ENCOUNTER — OFFICE VISIT (OUTPATIENT)
Age: 79
End: 2024-08-23
Payer: MEDICARE

## 2024-08-23 VITALS
RESPIRATION RATE: 18 BRPM | TEMPERATURE: 98.7 F | SYSTOLIC BLOOD PRESSURE: 118 MMHG | WEIGHT: 220 LBS | OXYGEN SATURATION: 97 % | BODY MASS INDEX: 37.56 KG/M2 | HEIGHT: 64 IN | DIASTOLIC BLOOD PRESSURE: 66 MMHG | HEART RATE: 55 BPM

## 2024-08-23 DIAGNOSIS — E78.2 MIXED HYPERLIPIDEMIA: ICD-10-CM

## 2024-08-23 DIAGNOSIS — Z79.899 ENCOUNTER FOR LONG-TERM (CURRENT) USE OF MEDICATIONS: ICD-10-CM

## 2024-08-23 DIAGNOSIS — R73.02 IMPAIRED GLUCOSE TOLERANCE (ORAL): ICD-10-CM

## 2024-08-23 DIAGNOSIS — E66.9 NON MORBID OBESITY: ICD-10-CM

## 2024-08-23 DIAGNOSIS — I10 ESSENTIAL (PRIMARY) HYPERTENSION: ICD-10-CM

## 2024-08-23 DIAGNOSIS — Z00.00 MEDICARE ANNUAL WELLNESS VISIT, SUBSEQUENT: Primary | ICD-10-CM

## 2024-08-23 DIAGNOSIS — J30.89 ENVIRONMENTAL AND SEASONAL ALLERGIES: ICD-10-CM

## 2024-08-23 DIAGNOSIS — R05.8 ALLERGIC COUGH: ICD-10-CM

## 2024-08-23 LAB
ALBUMIN SERPL-MCNC: 3.8 G/DL (ref 3.5–5)
ALBUMIN/GLOB SERPL: 1.1 (ref 1.1–2.2)
ALP SERPL-CCNC: 76 U/L (ref 45–117)
ALT SERPL-CCNC: 41 U/L (ref 12–78)
ANION GAP SERPL CALC-SCNC: 2 MMOL/L (ref 5–15)
APPEARANCE UR: CLEAR
AST SERPL-CCNC: 34 U/L (ref 15–37)
BACTERIA URNS QL MICRO: NEGATIVE /HPF
BILIRUB SERPL-MCNC: 0.4 MG/DL (ref 0.2–1)
BILIRUB UR QL: NEGATIVE
BUN SERPL-MCNC: 11 MG/DL (ref 6–20)
BUN/CREAT SERPL: 13 (ref 12–20)
CALCIUM SERPL-MCNC: 9.7 MG/DL (ref 8.5–10.1)
CHLORIDE SERPL-SCNC: 100 MMOL/L (ref 97–108)
CHOLEST SERPL-MCNC: 164 MG/DL
CO2 SERPL-SCNC: 38 MMOL/L (ref 21–32)
COLOR UR: ABNORMAL
CREAT SERPL-MCNC: 0.85 MG/DL (ref 0.55–1.02)
EPITH CASTS URNS QL MICRO: ABNORMAL /LPF
ERYTHROCYTE [DISTWIDTH] IN BLOOD BY AUTOMATED COUNT: 16.6 % (ref 11.5–14.5)
GLOBULIN SER CALC-MCNC: 3.6 G/DL (ref 2–4)
GLUCOSE SERPL-MCNC: 100 MG/DL (ref 65–100)
GLUCOSE UR STRIP.AUTO-MCNC: NEGATIVE MG/DL
GLUCOSE, POC: 125 MG/DL
HBA1C MFR BLD: 6 %
HCT VFR BLD AUTO: 41.4 % (ref 35–47)
HDLC SERPL-MCNC: 64 MG/DL
HDLC SERPL: 2.6 (ref 0–5)
HGB BLD-MCNC: 12.8 G/DL (ref 11.5–16)
HGB UR QL STRIP: NEGATIVE
HYALINE CASTS URNS QL MICRO: ABNORMAL /LPF (ref 0–5)
KETONES UR QL STRIP.AUTO: NEGATIVE MG/DL
LDLC SERPL CALC-MCNC: 84 MG/DL (ref 0–100)
LEUKOCYTE ESTERASE UR QL STRIP.AUTO: NEGATIVE
MCH RBC QN AUTO: 24.2 PG (ref 26–34)
MCHC RBC AUTO-ENTMCNC: 30.9 G/DL (ref 30–36.5)
MCV RBC AUTO: 78.4 FL (ref 80–99)
NITRITE UR QL STRIP.AUTO: NEGATIVE
NRBC # BLD: 0 K/UL (ref 0–0.01)
NRBC BLD-RTO: 0 PER 100 WBC
PH UR STRIP: 5.5 (ref 5–8)
PLATELET # BLD AUTO: 292 K/UL (ref 150–400)
PMV BLD AUTO: 10.3 FL (ref 8.9–12.9)
POTASSIUM SERPL-SCNC: 3.3 MMOL/L (ref 3.5–5.1)
PROT SERPL-MCNC: 7.4 G/DL (ref 6.4–8.2)
PROT UR STRIP-MCNC: NEGATIVE MG/DL
RBC # BLD AUTO: 5.28 M/UL (ref 3.8–5.2)
RBC #/AREA URNS HPF: ABNORMAL /HPF (ref 0–5)
SODIUM SERPL-SCNC: 140 MMOL/L (ref 136–145)
SP GR UR REFRACTOMETRY: 1.02 (ref 1–1.03)
TRIGL SERPL-MCNC: 80 MG/DL
UROBILINOGEN UR QL STRIP.AUTO: 0.2 EU/DL (ref 0.2–1)
VLDLC SERPL CALC-MCNC: 16 MG/DL
WBC # BLD AUTO: 5.5 K/UL (ref 3.6–11)
WBC URNS QL MICRO: ABNORMAL /HPF (ref 0–4)

## 2024-08-23 PROCEDURE — 83036 HEMOGLOBIN GLYCOSYLATED A1C: CPT | Performed by: FAMILY MEDICINE

## 2024-08-23 PROCEDURE — 82962 GLUCOSE BLOOD TEST: CPT | Performed by: FAMILY MEDICINE

## 2024-08-23 PROCEDURE — 99214 OFFICE O/P EST MOD 30 MIN: CPT | Performed by: FAMILY MEDICINE

## 2024-08-23 RX ORDER — ACETAMINOPHEN 160 MG
2 TABLET,DISINTEGRATING ORAL DAILY
COMMUNITY
Start: 2024-08-23

## 2024-08-23 RX ORDER — DEXTROMETHORPHAN HYDROBROMIDE AND PROMETHAZINE HYDROCHLORIDE 15; 6.25 MG/5ML; MG/5ML
5 SYRUP ORAL EVERY 6 HOURS PRN
Qty: 180 ML | Refills: 0 | Status: SHIPPED | OUTPATIENT
Start: 2024-08-23

## 2024-08-23 SDOH — HEALTH STABILITY: PHYSICAL HEALTH: ON AVERAGE, HOW MANY MINUTES DO YOU ENGAGE IN EXERCISE AT THIS LEVEL?: 60 MIN

## 2024-08-23 SDOH — HEALTH STABILITY: PHYSICAL HEALTH: ON AVERAGE, HOW MANY DAYS PER WEEK DO YOU ENGAGE IN MODERATE TO STRENUOUS EXERCISE (LIKE A BRISK WALK)?: 5 DAYS

## 2024-08-23 ASSESSMENT — ENCOUNTER SYMPTOMS
CHEST TIGHTNESS: 0
RHINORRHEA: 0
ABDOMINAL PAIN: 0
SHORTNESS OF BREATH: 0
CONSTIPATION: 0
COUGH: 0
BLOOD IN STOOL: 0

## 2024-08-23 ASSESSMENT — LIFESTYLE VARIABLES
HOW MANY STANDARD DRINKS CONTAINING ALCOHOL DO YOU HAVE ON A TYPICAL DAY: 1 OR 2
HOW OFTEN DO YOU HAVE SIX OR MORE DRINKS ON ONE OCCASION: 1
HOW OFTEN DO YOU HAVE A DRINK CONTAINING ALCOHOL: MONTHLY OR LESS
HOW OFTEN DO YOU HAVE A DRINK CONTAINING ALCOHOL: 2
HOW MANY STANDARD DRINKS CONTAINING ALCOHOL DO YOU HAVE ON A TYPICAL DAY: 1

## 2024-08-23 ASSESSMENT — PATIENT HEALTH QUESTIONNAIRE - PHQ9
SUM OF ALL RESPONSES TO PHQ QUESTIONS 1-9: 0
2. FEELING DOWN, DEPRESSED OR HOPELESS: NOT AT ALL
SUM OF ALL RESPONSES TO PHQ QUESTIONS 1-9: 0
SUM OF ALL RESPONSES TO PHQ9 QUESTIONS 1 & 2: 0
SUM OF ALL RESPONSES TO PHQ QUESTIONS 1-9: 0
1. LITTLE INTEREST OR PLEASURE IN DOING THINGS: NOT AT ALL
SUM OF ALL RESPONSES TO PHQ QUESTIONS 1-9: 0

## 2024-08-23 NOTE — PROGRESS NOTES
Medicare Annual Wellness Visit    Cora Solis is here for Medicare AWV    Assessment & Plan   Medicare annual wellness visit, subsequent  Essential (primary) hypertension  Mixed hyperlipidemia  -     Lipid Panel; Future  Impaired glucose tolerance (oral)  -     AMB POC GLUCOSE BLOOD, BY GLUCOSE MONITORING DEVICE  -     AMB POC HEMOGLOBIN A1C  Environmental and seasonal allergies  Allergic cough  -     promethazine-dextromethorphan (PROMETHAZINE-DM) 6.25-15 MG/5ML syrup; Take 5 mLs by mouth every 6 hours as needed for Cough, Disp-180 mL, R-0Normal  Encounter for long-term (current) use of medications  -     Comprehensive Metabolic Panel; Future  -     CBC; Future  -     Urinalysis with Microscopic; Future  Non morbid obesity    Recommendations for Preventive Services Due: see orders and patient instructions/AVS.  Recommended screening schedule for the next 5-10 years is provided to the patient in written form: see Patient Instructions/AVS.     Return in about 6 months (around 2/23/2025).     Subjective   Patient's complete Health Risk Assessment and screening values have been reviewed and are found in Flowsheets. The following problems were reviewed today and where indicated follow up appointments were made and/or referrals ordered.    Positive Risk Factor Screenings with Interventions:                  Abnormal BMI (obese):  Body mass index is 37.76 kg/m². (!) Abnormal  Interventions:  See AVS for additional education material      Dentist Screen:  Have you seen the dentist within the past year?: (!) No    Intervention:  Advised to schedule with their dentist                Objective   Vitals:    08/23/24 0919   BP: 118/66   Site: Left Upper Arm   Position: Sitting   Cuff Size: Large Adult   Pulse: 55   Resp: 18   Temp: 98.7 °F (37.1 °C)   TempSrc: Oral   SpO2: 97%   Weight: 99.8 kg (220 lb)   Height: 1.626 m (5' 4\")      Body mass index is 37.76 kg/m².                 Allergies   Allergen Reactions

## 2024-08-23 NOTE — PROGRESS NOTES
Chief Complaint   Patient presents with    Medicare AWV       \"Have you been to the ER, urgent care clinic since your last visit?  Hospitalized since your last visit?\"    NO    “Have you seen or consulted any other health care providers outside of [facility] since your last visit?”    NO            Click Here for Release of Records Request      Vitals:    24 0919   BP: 118/66   Pulse: 55   Resp: 18   Temp: 98.7 °F (37.1 °C)   SpO2: 97%       Health Maintenance Due   Topic Date Due    Shingles vaccine (1 of 2) Never done    Annual Wellness Visit (Medicare Advantage)  2024    Flu vaccine (1) 2024        The patient, Cora Solis, identity was verified by name and .

## 2024-08-23 NOTE — PATIENT INSTRUCTIONS

## 2024-08-23 NOTE — PROGRESS NOTES
Subjective:      Patient ID: Cora Solis is a 78 y.o. female.    HPI  Follow up on chronic medical problems.  Overall feeling well.  No issues noted today.    Cardiovascular Review:  The patient has hypertension and hyperlipidemia.  Tolerating Norvasc well.  No chest pain and no SOB.   No chest pain or palpitations.     Diet and Lifestyle: generally follows a low fat low cholesterol diet, generally follows a low sodium diet, exercises regularly  Home BP Monitoring: Ranging 120-130ss/80s.    Pertinent ROS: taking medications as instructed, no TIA's, no chest pain on exertion, no dyspnea on exertion, noting improvement in swelling in swelling.      Glucose intolerance reveiw:  She has IGT.  Last A1c 5.8%  Diabetic ROS - further diabetic ROS: no polyuria or polydipsia, no chest pain, dyspnea or TIA's, no numbness, tingling or pain in extremities, no unusual visual symptoms.   Lab review: orders written for new lab studies as appropriate; see orders.   HM:  Mammogram 8/16/23.    Bone density 4/17/2018  FIT: 8/29/2019 negative.  Not indicated d/t age.   Past Medical History:   Diagnosis Date    Acute non-Q wave non-ST elevation myocardial infarction (NSTEMI) ([facility]) 7/8/2023    Atrial fibrillation ([facility]) 3/2/2023    Lost 2 very close cousins same wk    Gout     Hypertension     NSTEMI (non-ST elevated myocardial infarction) ([facility]) 7/8/2023     Past Surgical History:   Procedure Laterality Date    BREAST BIOPSY Right     benign bx age 40's    COLONOSCOPY FLX DX W/COLLJ SPEC WHEN PFRMD      \"long time ago\"    DILATION AND CURETTAGE OF UTERUS       Current Outpatient Medications   Medication Sig Dispense Refill    promethazine-dextromethorphan (PROMETHAZINE-DM) 6.25-15 MG/5ML syrup Take 5 mLs by mouth every 6 hours as needed for Cough 180 mL 0    Cholecalciferol (VITAMIN D3) 50 MCG (2000 UT) CAPS Take 2 capsules by mouth daily      allopurinol (ZYLOPRIM) 100 MG tablet TAKE 1 TABLET EVERY DAY 90 tablet 3    aspirin 81 MG EC

## 2024-08-29 ENCOUNTER — TELEPHONE (OUTPATIENT)
Age: 79
End: 2024-08-29

## 2024-08-29 RX ORDER — POTASSIUM CHLORIDE 750 MG/1
10 TABLET, EXTENDED RELEASE ORAL
Qty: 90 TABLET | Refills: 1 | Status: SHIPPED | OUTPATIENT
Start: 2024-08-30 | End: 2024-08-29 | Stop reason: SDUPTHER

## 2024-08-29 RX ORDER — POTASSIUM CHLORIDE 750 MG/1
10 TABLET, EXTENDED RELEASE ORAL DAILY
Qty: 90 TABLET | Refills: 1 | Status: SHIPPED | OUTPATIENT
Start: 2024-08-29

## 2024-09-18 ENCOUNTER — OFFICE VISIT (OUTPATIENT)
Age: 79
End: 2024-09-18
Payer: MEDICARE

## 2024-09-18 VITALS
DIASTOLIC BLOOD PRESSURE: 67 MMHG | HEART RATE: 72 BPM | RESPIRATION RATE: 18 BRPM | OXYGEN SATURATION: 93 % | SYSTOLIC BLOOD PRESSURE: 119 MMHG | TEMPERATURE: 98.1 F

## 2024-09-18 DIAGNOSIS — Z23 NEED FOR VACCINATION: Primary | ICD-10-CM

## 2024-09-18 PROCEDURE — G0008 ADMIN INFLUENZA VIRUS VAC: HCPCS | Performed by: FAMILY MEDICINE

## 2024-09-18 PROCEDURE — PBSHW INFLUENZA, FLUAD TRIVALENT, (AGE 65 Y+), IM, PRESERVATIVE FREE, 0.5ML: Performed by: FAMILY MEDICINE

## 2024-09-18 PROCEDURE — 90653 IIV ADJUVANT VACCINE IM: CPT | Performed by: FAMILY MEDICINE

## 2024-10-07 RX ORDER — AMLODIPINE BESYLATE 5 MG/1
TABLET ORAL
Qty: 90 TABLET | Refills: 3 | Status: SHIPPED | OUTPATIENT
Start: 2024-10-07

## 2024-10-07 RX ORDER — TRIAMTERENE AND HYDROCHLOROTHIAZIDE 37.5; 25 MG/1; MG/1
CAPSULE ORAL
Qty: 90 CAPSULE | Refills: 3 | Status: SHIPPED | OUTPATIENT
Start: 2024-10-07

## 2024-10-07 NOTE — TELEPHONE ENCOUNTER
Last appointment: 9/18/24  Next appointment: 2/25/25  Previous refill encounter(s): 12/18/23 & 8/8/23    Requested Prescriptions     Pending Prescriptions Disp Refills    triamterene-hydroCHLOROthiazide (DYAZIDE) 37.5-25 MG per capsule [Pharmacy Med Name: Triamterene-HCTZ Oral Capsule 37.5-25 MG] 90 capsule 3     Sig: TAKE 1 CAPSULE EVERY DAY    amLODIPine (NORVASC) 5 MG tablet [Pharmacy Med Name: amLODIPine Besylate Oral Tablet 5 MG] 90 tablet 3     Sig: TAKE 1 TABLET EVERY DAY         For Pharmacy Admin Tracking Only    Program: Medication Refill  CPA in place:    Recommendation Provided To:   Intervention Detail: New Rx: 2, reason: Patient Preference  Intervention Accepted By:   Gap Closed?:    Time Spent (min): 5

## 2024-11-02 NOTE — PROGRESS NOTES
Shaunna Sapp is a 42 year old female.  Earwax removal  HPI:   Patient is here to remove the earwax from the right ear canal.  She was using antibiotic drops and hydrogen peroxide.  Decreased hearing right ear -significant change after earwax removal  Current Outpatient Medications   Medication Sig Dispense Refill    neomycin-polymyxin-hydrocortisone 3.5-84485-7 Otic Suspension Place 4 drops into the left ear 3 (three) times daily. 10 mL 0      Past Medical History:    Gestational hypertension (HCC)    Induced at 37 wk       Social History:  Social History     Socioeconomic History    Marital status:    Tobacco Use    Smoking status: Never    Smokeless tobacco: Never   Vaping Use    Vaping status: Never Used   Substance and Sexual Activity    Alcohol use: Never    Drug use: Never    Sexual activity: Yes     Partners: Male     Birth control/protection: Condom   Other Topics Concern    Caffeine Concern No    Exercise No    Seat Belt No    Special Diet No    Stress Concern No    Weight Concern No        REVIEW OF SYSTEMS:   GENERAL HEALTH: feels well otherwise  SKIN: denies any unusual skin lesions or rashes  HEENT no congestion no runny nose no sore throat, earwax right ear canal  Neck no neck pain   RESPIRATORY: denies shortness of breath with exertion  CARDIOVASCULAR: denies chest pain on exertion  Psych normal mood    EXAM:   /80 (BP Location: Left arm, Patient Position: Sitting, Cuff Size: adult)   Pulse 100   Temp 97 °F (36.1 °C) (Temporal)   Resp 12   Ht 5' 2.99\" (1.6 m)   Wt 164 lb (74.4 kg)   LMP 10/19/2024 (Exact Date)   BMI 29.06 kg/m²   GENERAL: well developed, well nourished,in no apparent distress  SKIN: no rashes,no suspicious lesions  HEENT: atraumatic, normocephalic,ears -left ear clean, right ear occluded by earwax completely removed as below   NECK: supple,  LUNGS: clear   Psychiatric - alert  and oriented x3, normal mood      ASSESSMENT AND PLAN:     Encounter  This is a Subsequent Medicare Annual Wellness Exam (AWV) (Performed 12 months after IPPE or effective date of Medicare Part B enrollment)    I have reviewed the patient's medical history in detail and updated the computerized patient record. History     Patient Active Problem List   Diagnosis Code    Hypovitaminosis D E55.9    Hyperlipidemia E78.5    Asymptomatic PVCs/APC's I49.3    IGT (impaired glucose tolerance) R73.02    Essential hypertension I10    Encounter for medication monitoring Z51.81    Severe obesity (BMI 35.0-39. 9) with comorbidity (HCC) E66.01       Current Outpatient Medications   Medication Sig Dispense Refill    potassium chloride SR (KLOR-CON 10) 10 mEq tablet Take 1 Tab by mouth two (2) times a week. 45 Tab 3    allopurinol (ZYLOPRIM) 100 mg tablet TAKE 1 TABLET BY MOUTH DAILY 90 Tab 3    amLODIPine (NORVASC) 5 mg tablet TAKE 1 TABLET BY MOUTH DAILY 90 Tab 3    triamterene-hydroCHLOROthiazide (DYAZIDE) 37.5-25 mg per capsule Take 1 Cap by mouth every other day. 90 Cap 3    ibuprofen (ADVIL) 200 mg tablet Take 200 mg by mouth every six (6) hours as needed for Pain.  chlorpheniramine-dm (CORICIDIN HBP COUGH AND COLD) 4-30 mg tab Take 1 Tab by mouth daily as needed.  cholecalciferol, vitamin D3, (VITAMIN D3) 2,000 unit tab Take 2,000 Units by mouth daily.  ferrous sulfate 325 mg (65 mg iron) tablet Take 325 mg by mouth two (2) times a week.  aspirin 81 mg tablet Take 1 Tab by mouth daily. Allergies   Allergen Reactions    Triamterene Swelling and Other (comments)     Diazide is ok. ...just it's generic.     Prinzide [Lisinopril-Hydrochlorothiazide] Shortness of Breath     fatigue       Past Medical History:   Diagnosis Date    Gout     Hypertension        Past Surgical History:   Procedure Laterality Date    HX BREAST BIOPSY Right     benign bx age 42's   [de-identified] DILATION AND CURETTAGE      DC COLONOSCOPY FLX DX W/COLLJ SPEC WHEN PFRMD      \"long Diagnosis   Name Primary?    Hearing loss of right ear due to cerumen impaction Yes       No orders of the defined types were placed in this encounter.    Procedure note  Patient positioned on the table after informed consent was obtained.  Earwax was removed with water lavage and curette in usual fashion.  Copious amount of earwax removed.  There was mild irritation of the external ear canal patient will use antibiotic drops for few days.  Patient tolerated procedure well.  Noted significant improvement in hearing.  Do not use Q-tips.  Meds & Refills for this Visit:  Requested Prescriptions      No prescriptions requested or ordered in this encounter     Do not use Q-tips.    Use antibiotic drops for the next 5 days 3 times per day.    Imaging & Consults:  None    The patient indicates understanding of these issues and agrees to the plan.  The patient is asked to return in prn.   The note was dictated using speech recognition software.  Accuracy and grammar in transcription may be subject to error.    time ago\"       Family History   Problem Relation Age of Onset    Anemia Mother     Heart Failure Mother     Heart Failure Father     Dementia Father         alzheimers    No Known Problems Sister     No Known Problems Brother     No Known Problems Brother     No Known Problems Brother     Heart Attack Sister     No Known Problems Brother     No Known Problems Sister        Social History     Tobacco Use    Smoking status: Never Smoker    Smokeless tobacco: Never Used   Substance Use Topics    Alcohol use: Yes     Alcohol/week: 0.0 standard drinks     Comment: rarely            Depression Risk Factor Screening:     PHQ over the last two weeks 11/7/2017   Little interest or pleasure in doing things Not at all   Feeling down, depressed or hopeless Not at all   Total Score PHQ 2 0     Alcohol Risk Factor Screening: You do not drink alcohol or very rarely. Functional Ability and Level of Safety:   Hearing Loss  Hearing is good. Activities of Daily Living  The home contains: no safety equipment. Patient does total self care    Fall RiskFall Risk Assessment, last 12 mths 11/7/2017   Able to walk? Yes   Fall in past 12 months? No     Functional Ability:   Does the patient exhibit a steady gait? yes    How long did it take the patient to get up and walk from a sitting position? seconds    Is the patient self reliant? (ie can do own laundry, meals, household chores)  yes   Does the patient handle his/her own medications? yes   Does the patient handle his/her own money? yes   Is the patients home safe (ie good lighting, handrails on stairs and bath, etc.)? yes   Did you notice or did patient express any hearing difficulties? no   Did you notice or did patient express any vision difficulties?   no        Advance Care Planning:   Patient was offered the opportunity to discuss advance care planning:  yes    Does patient have an Advance Directive:  no   If no, did you provide information on Caring Connections? yes      Abuse Screen  Patient is not abused    Cognitive Screening   Evaluation of Cognitive Function:  Has your family/caregiver stated any concerns about your memory: no      Patient Care Team   Patient Care Team:  Jeny Giron MD as PCP - General    Assessment/Plan   Education and counseling provided:  Are appropriate based on today's review and evaluation  End-of-Life planning (with patient's consent)    ASSESSMENT and PLAN    Medicare Annual Wellness  Continue current treatment plan. Continue annual follow up. I have discussed diagnosis listed in this note with pt and/or family. I have discussed treatment plans and options and the risk/benefit analysis of those options, including safe use of medications and possible medication side effects. Through the use of shared decision making we have agreed to the above plan. The patient has received an after-visit summary and questions were answered concerning future plans and follow up. Advise pt of any urgent changes then to proceed to the ER.

## 2025-03-03 DIAGNOSIS — R05.8 ALLERGIC COUGH: ICD-10-CM

## 2025-03-03 RX ORDER — POTASSIUM CHLORIDE 750 MG/1
10 TABLET, EXTENDED RELEASE ORAL DAILY
Qty: 90 TABLET | Refills: 3 | Status: SHIPPED | OUTPATIENT
Start: 2025-03-03

## 2025-03-03 RX ORDER — ALLOPURINOL 100 MG/1
100 TABLET ORAL DAILY
Qty: 90 TABLET | Refills: 3 | Status: SHIPPED | OUTPATIENT
Start: 2025-03-03

## 2025-03-03 RX ORDER — TRIAMTERENE AND HYDROCHLOROTHIAZIDE 37.5; 25 MG/1; MG/1
1 CAPSULE ORAL DAILY
Qty: 90 CAPSULE | Refills: 3 | Status: SHIPPED | OUTPATIENT
Start: 2025-03-03

## 2025-03-03 NOTE — TELEPHONE ENCOUNTER
[phone] Patient stopped by to drop off her new insurance card.  She spoke with Madelaine Saturday 3/1/2025 at 1:45pm She said for her mail order RX's to begin at [facility]. Fax script..  [phone]. Phone# [phone].  Right now she need Gout & PM  BP-Triamterene/HCTZ 37.25 mg Cap Qty 90 and Potassium CL ER 10 Meq Qty 36

## 2025-04-05 SDOH — ECONOMIC STABILITY: FOOD INSECURITY: WITHIN THE PAST 12 MONTHS, THE FOOD YOU BOUGHT JUST DIDN'T LAST AND YOU DIDN'T HAVE MONEY TO GET MORE.: NEVER TRUE

## 2025-04-05 SDOH — ECONOMIC STABILITY: INCOME INSECURITY: IN THE LAST 12 MONTHS, WAS THERE A TIME WHEN YOU WERE NOT ABLE TO PAY THE MORTGAGE OR RENT ON TIME?: NO

## 2025-04-05 SDOH — ECONOMIC STABILITY: FOOD INSECURITY: WITHIN THE PAST 12 MONTHS, YOU WORRIED THAT YOUR FOOD WOULD RUN OUT BEFORE YOU GOT MONEY TO BUY MORE.: NEVER TRUE

## 2025-04-05 SDOH — ECONOMIC STABILITY: TRANSPORTATION INSECURITY
IN THE PAST 12 MONTHS, HAS THE LACK OF TRANSPORTATION KEPT YOU FROM MEDICAL APPOINTMENTS OR FROM GETTING MEDICATIONS?: NO

## 2025-04-08 ENCOUNTER — OFFICE VISIT (OUTPATIENT)
Age: 80
End: 2025-04-08
Payer: MEDICARE

## 2025-04-08 VITALS
TEMPERATURE: 98 F | HEART RATE: 64 BPM | DIASTOLIC BLOOD PRESSURE: 78 MMHG | SYSTOLIC BLOOD PRESSURE: 135 MMHG | OXYGEN SATURATION: 96 % | BODY MASS INDEX: 36.15 KG/M2 | RESPIRATION RATE: 20 BRPM | WEIGHT: 210.6 LBS

## 2025-04-08 DIAGNOSIS — E66.9 NON MORBID OBESITY: ICD-10-CM

## 2025-04-08 DIAGNOSIS — E78.2 MIXED HYPERLIPIDEMIA: ICD-10-CM

## 2025-04-08 DIAGNOSIS — Z79.899 ENCOUNTER FOR LONG-TERM (CURRENT) USE OF MEDICATIONS: ICD-10-CM

## 2025-04-08 DIAGNOSIS — R73.02 IMPAIRED GLUCOSE TOLERANCE (ORAL): ICD-10-CM

## 2025-04-08 DIAGNOSIS — I10 ESSENTIAL (PRIMARY) HYPERTENSION: Primary | ICD-10-CM

## 2025-04-08 DIAGNOSIS — J30.89 ENVIRONMENTAL AND SEASONAL ALLERGIES: ICD-10-CM

## 2025-04-08 LAB
ALBUMIN SERPL-MCNC: 3.8 G/DL (ref 3.5–5)
ALBUMIN/GLOB SERPL: 1 (ref 1.1–2.2)
ALP SERPL-CCNC: 86 U/L (ref 45–117)
ALT SERPL-CCNC: 29 U/L (ref 12–78)
ANION GAP SERPL CALC-SCNC: 4 MMOL/L (ref 2–12)
AST SERPL-CCNC: 27 U/L (ref 15–37)
BILIRUB SERPL-MCNC: 0.4 MG/DL (ref 0.2–1)
BUN SERPL-MCNC: 21 MG/DL (ref 6–20)
BUN/CREAT SERPL: 23 (ref 12–20)
CALCIUM SERPL-MCNC: 9.9 MG/DL (ref 8.5–10.1)
CHLORIDE SERPL-SCNC: 98 MMOL/L (ref 97–108)
CHOLEST SERPL-MCNC: 190 MG/DL
CO2 SERPL-SCNC: 36 MMOL/L (ref 21–32)
CREAT SERPL-MCNC: 0.91 MG/DL (ref 0.55–1.02)
ERYTHROCYTE [DISTWIDTH] IN BLOOD BY AUTOMATED COUNT: 16.9 % (ref 11.5–14.5)
GLOBULIN SER CALC-MCNC: 3.8 G/DL (ref 2–4)
GLUCOSE SERPL-MCNC: 91 MG/DL (ref 65–100)
GLUCOSE, POC: 109 MG/DL
HBA1C MFR BLD: 5.8 %
HCT VFR BLD AUTO: 42.6 % (ref 35–47)
HDLC SERPL-MCNC: 70 MG/DL
HDLC SERPL: 2.7 (ref 0–5)
HGB BLD-MCNC: 13 G/DL (ref 11.5–16)
LDLC SERPL CALC-MCNC: 103.2 MG/DL (ref 0–100)
MCH RBC QN AUTO: 24.1 PG (ref 26–34)
MCHC RBC AUTO-ENTMCNC: 30.5 G/DL (ref 30–36.5)
MCV RBC AUTO: 78.9 FL (ref 80–99)
NRBC # BLD: 0 K/UL (ref 0–0.01)
NRBC BLD-RTO: 0 PER 100 WBC
PLATELET # BLD AUTO: 268 K/UL (ref 150–400)
PMV BLD AUTO: 11 FL (ref 8.9–12.9)
POTASSIUM SERPL-SCNC: 3.8 MMOL/L (ref 3.5–5.1)
PROT SERPL-MCNC: 7.6 G/DL (ref 6.4–8.2)
RBC # BLD AUTO: 5.4 M/UL (ref 3.8–5.2)
SODIUM SERPL-SCNC: 138 MMOL/L (ref 136–145)
TRIGL SERPL-MCNC: 84 MG/DL
VLDLC SERPL CALC-MCNC: 16.8 MG/DL
WBC # BLD AUTO: 6.7 K/UL (ref 3.6–11)

## 2025-04-08 PROCEDURE — 82962 GLUCOSE BLOOD TEST: CPT | Performed by: FAMILY MEDICINE

## 2025-04-08 PROCEDURE — 3075F SYST BP GE 130 - 139MM HG: CPT | Performed by: FAMILY MEDICINE

## 2025-04-08 PROCEDURE — 1159F MED LIST DOCD IN RCRD: CPT | Performed by: FAMILY MEDICINE

## 2025-04-08 PROCEDURE — 1160F RVW MEDS BY RX/DR IN RCRD: CPT | Performed by: FAMILY MEDICINE

## 2025-04-08 PROCEDURE — 1126F AMNT PAIN NOTED NONE PRSNT: CPT | Performed by: FAMILY MEDICINE

## 2025-04-08 PROCEDURE — 83036 HEMOGLOBIN GLYCOSYLATED A1C: CPT | Performed by: FAMILY MEDICINE

## 2025-04-08 PROCEDURE — PBSHW AMB POC HEMOGLOBIN A1C: Performed by: FAMILY MEDICINE

## 2025-04-08 PROCEDURE — 3078F DIAST BP <80 MM HG: CPT | Performed by: FAMILY MEDICINE

## 2025-04-08 PROCEDURE — PBSHW AMB POC GLUCOSE BLOOD, BY GLUCOSE MONITORING DEVICE: Performed by: FAMILY MEDICINE

## 2025-04-08 PROCEDURE — 99214 OFFICE O/P EST MOD 30 MIN: CPT | Performed by: FAMILY MEDICINE

## 2025-04-08 PROCEDURE — 1124F ACP DISCUSS-NO DSCNMKR DOCD: CPT | Performed by: FAMILY MEDICINE

## 2025-04-08 RX ORDER — IBUPROFEN 200 MG
200 TABLET ORAL EVERY 6 HOURS PRN
COMMUNITY

## 2025-04-08 RX ORDER — POTASSIUM CHLORIDE 750 MG/1
10 TABLET, EXTENDED RELEASE ORAL
COMMUNITY
Start: 2025-04-09

## 2025-04-08 RX ORDER — CETIRIZINE HYDROCHLORIDE 5 MG/1
5 TABLET ORAL DAILY PRN
COMMUNITY

## 2025-04-08 ASSESSMENT — PATIENT HEALTH QUESTIONNAIRE - PHQ9
SUM OF ALL RESPONSES TO PHQ QUESTIONS 1-9: 0
SUM OF ALL RESPONSES TO PHQ QUESTIONS 1-9: 0
1. LITTLE INTEREST OR PLEASURE IN DOING THINGS: NOT AT ALL
SUM OF ALL RESPONSES TO PHQ QUESTIONS 1-9: 0
2. FEELING DOWN, DEPRESSED OR HOPELESS: NOT AT ALL
SUM OF ALL RESPONSES TO PHQ QUESTIONS 1-9: 0

## 2025-04-08 ASSESSMENT — ENCOUNTER SYMPTOMS
BLOOD IN STOOL: 0
ABDOMINAL PAIN: 0
SHORTNESS OF BREATH: 0
CHEST TIGHTNESS: 0
COUGH: 0
CONSTIPATION: 0
RHINORRHEA: 0

## 2025-04-08 NOTE — PROGRESS NOTES
Subjective:      Patient ID: Cora Solis is a 79 y.o. female.    HPI  Follow up on chronic medical problems.  Overall feeling well.  No issues noted today.    Cardiovascular Review:  The patient has hypertension and hyperlipidemia.  Tolerating Norvasc well.  No chest pain and no SOB.   No chest pain or palpitations.     Diet and Lifestyle: generally follows a low fat low cholesterol diet, generally follows a low sodium diet, exercises regularly  Home BP Monitoring: Ranging 120-130ss/80s.    Pertinent ROS: taking medications as instructed, no TIA's, no chest pain on exertion, no dyspnea on exertion, noting improvement in swelling in swelling.      Glucose intolerance reveiw:  She has IGT.  Last A1c 5.8%  Diabetic ROS - further diabetic ROS: no polyuria or polydipsia, no chest pain, dyspnea or TIA's, no numbness, tingling or pain in extremities, no unusual visual symptoms.   Lab review: orders written for new lab studies as appropriate; see orders.   HM:  Mammogram 8/16/23.    FIT: 8/29/2019 negative.  Not indicated d/t age.     Past Medical History:   Diagnosis Date    Acute non-Q wave non-ST elevation myocardial infarction (NSTEMI) ([facility]) 7/8/2023    Atrial fibrillation ([facility]) 3/2/2023    Lost 2 very close cousins same wk    Gout     Hypertension     NSTEMI (non-ST elevated myocardial infarction) ([facility]) 7/8/2023     Past Surgical History:   Procedure Laterality Date    BREAST BIOPSY Right     benign bx age 40's    COLONOSCOPY FLX DX W/COLLJ SPEC WHEN PFRMD      \"long time ago\"    DILATION AND CURETTAGE OF UTERUS       Current Outpatient Medications   Medication Sig Dispense Refill    ibuprofen (ADVIL;MOTRIN) 200 MG tablet Take 1 tablet by mouth every 6 hours as needed for Pain      cetirizine (ZYRTEC) 5 MG tablet Take 1 tablet by mouth daily as needed for Allergies      triamterene-hydroCHLOROthiazide (DYAZIDE) 37.5-25 MG per capsule Take 1 capsule by mouth daily 90 capsule 3    allopurinol (ZYLOPRIM) 100 MG tablet

## 2025-04-08 NOTE — PROGRESS NOTES
Chief Complaint   Patient presents with    6 Month Follow-Up     Patient is here today for 6 month follow up.       \"Have you been to the ER, urgent care clinic since your last visit?  Hospitalized since your last visit?\"    NO    “Have you seen or consulted any other health care providers outside of [facility] since your last visit?”    NO            Click Here for Release of Records Request       There were no vitals filed for this visit.   Health Maintenance Due   Topic Date Due    Respiratory Syncytial Virus (RSV) Pregnant or age 60 yrs+ (1 - 1-dose 75+ series) Never done    COVID-19 Vaccine (2024- season) 2024    Annual Wellness Visit (Medicare Advantage)  2025        The patient, Cora Solis, identity was verified by name and .

## 2025-04-10 ENCOUNTER — RESULTS FOLLOW-UP (OUTPATIENT)
Age: 80
End: 2025-04-10

## 2025-07-24 RX ORDER — AMLODIPINE BESYLATE 5 MG/1
5 TABLET ORAL DAILY
Qty: 90 TABLET | Refills: 3 | Status: SHIPPED | OUTPATIENT
Start: 2025-07-24

## 2025-08-15 ENCOUNTER — TELEPHONE (OUTPATIENT)
Age: 80
End: 2025-08-15